# Patient Record
Sex: FEMALE | Race: OTHER | HISPANIC OR LATINO | Employment: UNEMPLOYED | URBAN - METROPOLITAN AREA
[De-identification: names, ages, dates, MRNs, and addresses within clinical notes are randomized per-mention and may not be internally consistent; named-entity substitution may affect disease eponyms.]

---

## 2021-12-08 ENCOUNTER — OFFICE VISIT (OUTPATIENT)
Dept: GASTROENTEROLOGY | Facility: CLINIC | Age: 26
End: 2021-12-08
Payer: COMMERCIAL

## 2021-12-08 VITALS
DIASTOLIC BLOOD PRESSURE: 64 MMHG | WEIGHT: 147 LBS | SYSTOLIC BLOOD PRESSURE: 110 MMHG | BODY MASS INDEX: 29.64 KG/M2 | HEART RATE: 70 BPM | TEMPERATURE: 98.6 F | HEIGHT: 59 IN | RESPIRATION RATE: 16 BRPM

## 2021-12-08 DIAGNOSIS — K21.9 LARYNGOPHARYNGEAL REFLUX (LPR): Primary | ICD-10-CM

## 2021-12-08 DIAGNOSIS — K21.9 GASTROESOPHAGEAL REFLUX DISEASE, UNSPECIFIED WHETHER ESOPHAGITIS PRESENT: ICD-10-CM

## 2021-12-08 PROCEDURE — 99243 OFF/OP CNSLTJ NEW/EST LOW 30: CPT | Performed by: PHYSICIAN ASSISTANT

## 2021-12-08 RX ORDER — PRENATAL VIT/IRON FUM/FOLIC AC 27MG-0.8MG
1 TABLET ORAL DAILY
COMMUNITY
Start: 2021-07-28 | End: 2022-03-11

## 2021-12-08 RX ORDER — OMEPRAZOLE 20 MG/1
20 CAPSULE, DELAYED RELEASE ORAL DAILY
Qty: 30 CAPSULE | Refills: 3 | Status: SHIPPED | OUTPATIENT
Start: 2021-12-08 | End: 2022-08-04

## 2021-12-09 ENCOUNTER — APPOINTMENT (OUTPATIENT)
Dept: RADIOLOGY | Facility: MEDICAL CENTER | Age: 26
End: 2021-12-09
Payer: COMMERCIAL

## 2021-12-09 ENCOUNTER — OFFICE VISIT (OUTPATIENT)
Dept: OBGYN CLINIC | Facility: MEDICAL CENTER | Age: 26
End: 2021-12-09
Payer: COMMERCIAL

## 2021-12-09 VITALS
HEIGHT: 59 IN | DIASTOLIC BLOOD PRESSURE: 70 MMHG | BODY MASS INDEX: 29.64 KG/M2 | HEART RATE: 61 BPM | SYSTOLIC BLOOD PRESSURE: 106 MMHG | WEIGHT: 147 LBS

## 2021-12-09 DIAGNOSIS — M54.2 NECK PAIN: ICD-10-CM

## 2021-12-09 DIAGNOSIS — M79.18 MYOFASCIAL PAIN SYNDROME: Primary | ICD-10-CM

## 2021-12-09 PROCEDURE — 99204 OFFICE O/P NEW MOD 45 MIN: CPT | Performed by: PHYSICAL MEDICINE & REHABILITATION

## 2021-12-09 PROCEDURE — 72040 X-RAY EXAM NECK SPINE 2-3 VW: CPT

## 2021-12-09 RX ORDER — METHYLPREDNISOLONE 4 MG/1
TABLET ORAL
Qty: 1 EACH | Refills: 0 | Status: SHIPPED | OUTPATIENT
Start: 2021-12-09 | End: 2022-03-11

## 2021-12-09 RX ORDER — NAPROXEN 500 MG/1
500 TABLET ORAL 2 TIMES DAILY PRN
Qty: 90 TABLET | Refills: 0 | Status: SHIPPED | OUTPATIENT
Start: 2021-12-09

## 2021-12-15 ENCOUNTER — EVALUATION (OUTPATIENT)
Dept: PHYSICAL THERAPY | Facility: REHABILITATION | Age: 26
End: 2021-12-15
Payer: COMMERCIAL

## 2021-12-15 DIAGNOSIS — M79.18 MYOFASCIAL PAIN SYNDROME: ICD-10-CM

## 2021-12-15 DIAGNOSIS — M54.2 NECK PAIN: ICD-10-CM

## 2021-12-15 PROCEDURE — 97162 PT EVAL MOD COMPLEX 30 MIN: CPT | Performed by: PHYSICAL THERAPIST

## 2021-12-21 ENCOUNTER — OFFICE VISIT (OUTPATIENT)
Dept: PHYSICAL THERAPY | Facility: REHABILITATION | Age: 26
End: 2021-12-21
Payer: COMMERCIAL

## 2021-12-21 DIAGNOSIS — M79.18 MYOFASCIAL PAIN SYNDROME: ICD-10-CM

## 2021-12-21 DIAGNOSIS — M54.2 NECK PAIN: Primary | ICD-10-CM

## 2021-12-21 PROCEDURE — 97110 THERAPEUTIC EXERCISES: CPT

## 2021-12-21 PROCEDURE — 97140 MANUAL THERAPY 1/> REGIONS: CPT

## 2021-12-22 ENCOUNTER — TELEPHONE (OUTPATIENT)
Dept: GASTROENTEROLOGY | Facility: CLINIC | Age: 26
End: 2021-12-22

## 2021-12-23 ENCOUNTER — APPOINTMENT (OUTPATIENT)
Dept: PHYSICAL THERAPY | Facility: REHABILITATION | Age: 26
End: 2021-12-23
Payer: COMMERCIAL

## 2021-12-27 ENCOUNTER — APPOINTMENT (OUTPATIENT)
Dept: PHYSICAL THERAPY | Facility: REHABILITATION | Age: 26
End: 2021-12-27
Payer: COMMERCIAL

## 2021-12-29 ENCOUNTER — APPOINTMENT (OUTPATIENT)
Dept: PHYSICAL THERAPY | Facility: REHABILITATION | Age: 26
End: 2021-12-29
Payer: COMMERCIAL

## 2022-03-10 RX ORDER — BUSPIRONE HYDROCHLORIDE 5 MG/1
5 TABLET ORAL DAILY PRN
COMMUNITY
Start: 2022-01-24 | End: 2023-01-24

## 2022-03-11 ENCOUNTER — OFFICE VISIT (OUTPATIENT)
Dept: FAMILY MEDICINE CLINIC | Facility: CLINIC | Age: 27
End: 2022-03-11
Payer: COMMERCIAL

## 2022-03-11 VITALS
RESPIRATION RATE: 20 BRPM | BODY MASS INDEX: 30.24 KG/M2 | SYSTOLIC BLOOD PRESSURE: 116 MMHG | HEART RATE: 72 BPM | HEIGHT: 59 IN | OXYGEN SATURATION: 98 % | DIASTOLIC BLOOD PRESSURE: 62 MMHG | WEIGHT: 150 LBS | TEMPERATURE: 98 F

## 2022-03-11 DIAGNOSIS — Z00.00 ANNUAL PHYSICAL EXAM: ICD-10-CM

## 2022-03-11 DIAGNOSIS — Z12.4 CERVICAL CANCER SCREENING: Primary | ICD-10-CM

## 2022-03-11 DIAGNOSIS — M54.2 NECK PAIN: ICD-10-CM

## 2022-03-11 DIAGNOSIS — F41.8 SITUATIONAL ANXIETY: ICD-10-CM

## 2022-03-11 PROBLEM — J68.3 REACTIVE AIRWAYS DYSFUNCTION SYNDROME (HCC): Status: ACTIVE | Noted: 2022-03-11

## 2022-03-11 PROCEDURE — 99385 PREV VISIT NEW AGE 18-39: CPT | Performed by: NURSE PRACTITIONER

## 2022-03-11 PROCEDURE — 3725F SCREEN DEPRESSION PERFORMED: CPT | Performed by: NURSE PRACTITIONER

## 2022-03-11 PROCEDURE — 1036F TOBACCO NON-USER: CPT | Performed by: NURSE PRACTITIONER

## 2022-03-11 PROCEDURE — 3008F BODY MASS INDEX DOCD: CPT | Performed by: NURSE PRACTITIONER

## 2022-03-11 NOTE — PROGRESS NOTES
ADULT ANNUAL [facility]    NAME: Alexandra Huerta  AGE: 31 y o  SEX: female  : 1995     DATE: 3/11/2022     Assessment and Plan:     Problem List Items Addressed This Visit        Other    Neck pain    Situational anxiety      Other Visit Diagnoses     Cervical cancer screening    -  Primary    Relevant Orders    Ambulatory Referral to Obstetrics / Gynecology    Annual physical exam              Immunizations and preventive care screenings were discussed with patient today  Appropriate education was printed on patient's after visit summary  Counseling:  Alcohol/drug use: discussed moderation in alcohol intake, the recommendations for healthy alcohol use, and avoidance of illicit drug use  Sexual health: discussed sexually transmitted diseases, partner selection, use of condoms, avoidance of unintended pregnancy, and contraceptive alternatives  · Exercise: the importance of regular exercise/physical activity was discussed  Recommend exercise 3-5 times per week for at least 30 minutes  BMI Counseling: Body mass index is 30 3 kg/m²  The BMI is above normal  Nutrition recommendations include encouraging healthy choices of fruits and vegetables, consuming healthier snacks, limiting drinks that contain sugar, moderation in carbohydrate intake, reducing intake of saturated and trans fat and reducing intake of cholesterol  Exercise recommendations include exercising 3-5 times per week  Rationale for BMI follow-up plan is due to patient being overweight or obese  Depression Screening and Follow-up Plan: Patient was screened for depression during today's encounter  They screened negative with a PHQ-2 score of 0  Return in 1 year (on 3/11/2023)       Chief Complaint:     Chief Complaint   Patient presents with   [address] [place] [address] on the back of throat, was told that it was acid refllux,       History of Present Illness: Adult Annual Physical   Patient here for a comprehensive physical exam  The patient reports problems - drivers permit physical  did have 2 miscarriages in the last 2 years    Not currently working  Diet and Physical Activity  · Diet/Nutrition: well balanced diet, limited junk food and consuming 3-5 servings of fruits/vegetables daily  · Exercise: strength training exercises, 3-4 times a week on average and 30-60 minutes on average  Depression Screening  PHQ-2/9 Depression Screening    Little interest or pleasure in doing things: 0 - not at all  Feeling down, depressed, or hopeless: 0 - not at all  PHQ-2 Score: 0  PHQ-2 Interpretation: Negative depression screen       General Health  · Sleep: sleeps well and gets 7-8 hours of sleep on average  · Hearing: normal - bilateral   · Vision: goes for regular eye exams, most recent eye exam <1 year ago and wears glasses and contacts  · Dental: regular dental visits and brushes teeth twice daily  /GYN Health  · Last menstrual period: 2/11/22  · Contraceptive method: none  · History of STDs?: no       Review of Systems:     Review of Systems   Constitutional: Negative for activity change, appetite change, chills, fatigue and fever  HENT: Negative for congestion, ear pain, nosebleeds, rhinorrhea and sore throat  Eyes: Negative for photophobia, pain, redness and visual disturbance  Respiratory: Negative for cough, shortness of breath and wheezing  Cardiovascular: Negative  Negative for chest pain  Gastrointestinal: Negative  Negative for abdominal pain, constipation, diarrhea and vomiting  Endocrine: Negative  Genitourinary: Negative for difficulty urinating, dysuria and flank pain  Musculoskeletal: Negative  Skin: Negative for color change and rash  Neurological: Negative for dizziness, weakness, numbness and headaches  Hematological: Negative for adenopathy  Psychiatric/Behavioral: Negative for agitation and confusion  The patient is not nervous/anxious  Past Medical History:     History reviewed  No pertinent past medical history  Past Surgical History:     Past Surgical History:   Procedure Laterality Date    TONGUE BIOPSY      WISDOM TOOTH EXTRACTION        Social History:     Social History     Socioeconomic History    Marital status: /Civil Union     Spouse name: None    Number of children: None    Years of education: None    Highest education level: None   Occupational History    None   Tobacco Use    Smoking status: Never Smoker    Smokeless tobacco: Never Used   Vaping Use    Vaping Use: Never used   Substance and Sexual Activity    Alcohol use: Never    Drug use: Never    Sexual activity: Yes     Birth control/protection: None   Other Topics Concern    None   Social History Narrative    - 6 years    Miscarried 2 times        Social Determinants of Health     Financial Resource Strain: Not on file   Food Insecurity: Not on file   Transportation Needs: Not on file   Physical Activity: Not on file   Stress: Not on file   Social Connections: Not on file   Intimate Partner Violence: Not on file   Housing Stability: Not on file      Family History:     Family History   Problem Relation Age of Onset    Diabetes Mother     Hyperlipidemia Mother     Hypertension Mother     Anemia Sister       Current Medications:     Current Outpatient Medications   Medication Sig Dispense Refill    busPIRone (BUSPAR) 5 mg tablet Take 5 mg by mouth daily as needed        Cholecalciferol 50 MCG (2000 UT) CAPS Take 2 capsules by mouth daily      fluticasone (FLONASE) 50 mcg/act nasal spray 2 sprays into each nostril daily 18 2 mL 3    naproxen (NAPROSYN) 500 mg tablet Take 1 tablet (500 mg total) by mouth 2 (two) times a day as needed for moderate pain 90 tablet 0    omeprazole (PriLOSEC) 20 mg delayed release capsule Take 1 capsule (20 mg total) by mouth daily 30 minutes before breakfast 30 capsule 3 No current facility-administered medications for this visit  Allergies:     No Known Allergies   Physical Exam:     /62   Pulse 72   Temp 98 °F (36 7 °C) (Tympanic)   Resp 20   Ht 4' 11" (1 499 m)   Wt 68 kg (150 lb)   SpO2 98%   BMI 30 30 kg/m²     Physical Exam  Vitals and nursing note reviewed  Constitutional:       General: She is not in acute distress  Appearance: She is well-developed  She is not ill-appearing or diaphoretic  HENT:      Head: Normocephalic and atraumatic  Right Ear: Hearing, tympanic membrane and ear canal normal       Left Ear: Hearing, tympanic membrane and ear canal normal       Nose: Nose normal       Mouth/Throat:      Pharynx: Oropharynx is clear  Uvula midline  No posterior oropharyngeal erythema  Eyes:      General: Lids are normal       Conjunctiva/sclera: Conjunctivae normal    Cardiovascular:      Rate and Rhythm: Normal rate and regular rhythm  Heart sounds: Normal heart sounds, S1 normal and S2 normal  No murmur heard  No gallop  Pulmonary:      Effort: Pulmonary effort is normal  No respiratory distress  Breath sounds: Normal breath sounds  Musculoskeletal:         General: No tenderness  Normal range of motion  Lymphadenopathy:      Cervical: No cervical adenopathy  Skin:     General: Skin is warm  Capillary Refill: Capillary refill takes less than 2 seconds  Findings: No rash  Neurological:      General: No focal deficit present  Mental Status: She is alert  Psychiatric:         Behavior: Behavior normal  Behavior is cooperative  Thought Content:  Thought content normal          Judgment: Judgment normal           SHINE Hoang   [facility]

## 2022-03-11 NOTE — PATIENT INSTRUCTIONS

## 2022-07-06 ENCOUNTER — TELEPHONE (OUTPATIENT)
Dept: GASTROENTEROLOGY | Facility: CLINIC | Age: 27
End: 2022-07-06

## 2022-07-06 NOTE — TELEPHONE ENCOUNTER
Called patient regarding missed 7/6 appointment with JUSTICE Unger  Left message to call so that we can assist patient rescheduling her missed appointment  Also sent letter

## 2022-07-12 ENCOUNTER — TELEPHONE (OUTPATIENT)
Dept: GYNECOLOGY | Facility: CLINIC | Age: 27
End: 2022-07-12

## 2022-07-12 NOTE — TELEPHONE ENCOUNTER
Called and lm regarding no show for todays appt  Told her to call the office if she was interested in rescheduling

## 2022-07-14 ENCOUNTER — OFFICE VISIT (OUTPATIENT)
Dept: GASTROENTEROLOGY | Facility: CLINIC | Age: 27
End: 2022-07-14
Payer: COMMERCIAL

## 2022-07-14 VITALS
OXYGEN SATURATION: 98 % | WEIGHT: 153 LBS | HEIGHT: 59 IN | TEMPERATURE: 98.6 F | BODY MASS INDEX: 30.84 KG/M2 | DIASTOLIC BLOOD PRESSURE: 66 MMHG | HEART RATE: 54 BPM | SYSTOLIC BLOOD PRESSURE: 118 MMHG

## 2022-07-14 DIAGNOSIS — K21.9 LARYNGOPHARYNGEAL REFLUX (LPR): Primary | ICD-10-CM

## 2022-07-14 DIAGNOSIS — K21.9 GASTROESOPHAGEAL REFLUX DISEASE, UNSPECIFIED WHETHER ESOPHAGITIS PRESENT: ICD-10-CM

## 2022-07-14 PROCEDURE — 99213 OFFICE O/P EST LOW 20 MIN: CPT | Performed by: PHYSICIAN ASSISTANT

## 2022-07-14 RX ORDER — SODIUM CHLORIDE 9 MG/ML
125 INJECTION, SOLUTION INTRAVENOUS CONTINUOUS
Status: CANCELLED | OUTPATIENT
Start: 2022-07-14

## 2022-07-14 NOTE — PATIENT INSTRUCTIONS
Scheduled date of EGD(as of today): 07/18/2022  Physician performing EGD: Dr Alvin Gold  Location of EGD: [facility]  Instructions reviewed with patient by: FLORIDALMA  Clearances:  n/a

## 2022-07-14 NOTE — H&P (VIEW-ONLY)
[facility] - Outpatient Follow-up Note  Laura Whitt 28 y o  female MRN: [id number]  Encounter: [id number]      ASSESSMENT AND PLAN:      1  Laryngopharyngeal reflux (LPR)  2  Gastroesophageal reflux disease, unspecified whether esophagitis present  She reports intermittent burning/sore throat, phlegm, globus sensation, and regurgitation for over 1 year  She was seen by ENT and diagnosed with reflux by laryngoscopy  Symptoms have improved with short courses of omeprazole but she still reports some burning in her throat       She still wants to reschedule the EGD  We can evaluate for erosive reflux disease, hiatal hernia, and eosinophilic esophagitis  She will continue to limit coffee, tomatoes, chocolate which are reflux trigger foods  She will continue to eat at least 2 hours before going to bed and follow other lifestyle recommendations      - EGD; Future    Follow-up in 3 months  ______________________________________________________________________     SUBJECTIVE:   54-year-old female with allergies presenting for follow-up of reflux  Since her last visit, she took omeprazole for a few days  This significantly improved her reflux  She was off omeprazole for a few months then restarted it about 1 month ago due to burning in her throat  This did help but she still reports nighttime burning  Overall her sore throat, phlegm, globus sensation, and regurgitation has improved  She did not undergo endoscopy yet, but she wants to reschedule      REVIEW OF SYSTEMS IS OTHERWISE NEGATIVE  Historical Information   History reviewed  No pertinent past medical history    Past Surgical History:   Procedure Laterality Date    TONGUE BIOPSY      WISDOM TOOTH EXTRACTION       Social History   Social History     Substance and Sexual Activity   Alcohol Use Never     Social History     Substance and Sexual Activity   Drug Use Never     Social History     Tobacco Use   Smoking Status Never Smoker Smokeless Tobacco Never Used     Family History   Problem Relation Age of Onset    Diabetes Mother     Hyperlipidemia Mother     Hypertension Mother     Anemia Sister        Meds/Allergies       Current Outpatient Medications:     busPIRone (BUSPAR) 5 mg tablet    Cholecalciferol 50 MCG (2000 UT) CAPS    fluticasone (FLONASE) 50 mcg/act nasal spray    naproxen (NAPROSYN) 500 mg tablet    omeprazole (PriLOSEC) 20 mg delayed release capsule    No Known Allergies        Objective     Blood pressure 118/66, pulse (!) 54, temperature 98 6 °F (37 °C), temperature source Tympanic, height 4' 11" (1 499 m), weight 69 4 kg (153 lb), SpO2 98 %  Body mass index is 30 9 kg/m²  PHYSICAL EXAM:      General Appearance:   Alert, cooperative, no distress   HEENT:   Normocephalic, atraumatic, anicteric      Neck:  Supple, symmetrical, trachea midline   Lungs:   Clear to auscultation bilaterally; no rales, rhonchi or wheezing; respirations unlabored    Heart[de-identified]   Regular rate and rhythm; no murmur, rub, or gallop  Abdomen:   Soft, non-tender, non-distended; normal bowel sounds; no masses, no organomegaly    Genitalia:   Deferred    Rectal:   Deferred    Extremities:  No cyanosis, clubbing or edema    Pulses:  2+ and symmetric    Skin:  No jaundice, rashes, or lesions    Lymph nodes:  No palpable cervical lymphadenopathy        Lab Results:   No visits with results within 1 Day(s) from this visit  Latest known visit with results is:   No results found for any previous visit  Radiology Results:   No results found

## 2022-07-14 NOTE — PROGRESS NOTES
[facility] - Outpatient Follow-up Note  Roro Mcdermott 28 y o  female MRN: [id number]  Encounter: [id number]      ASSESSMENT AND PLAN:      1  Laryngopharyngeal reflux (LPR)  2  Gastroesophageal reflux disease, unspecified whether esophagitis present  She reports intermittent burning/sore throat, phlegm, globus sensation, and regurgitation for over 1 year  She was seen by ENT and diagnosed with reflux by laryngoscopy  Symptoms have improved with short courses of omeprazole but she still reports some burning in her throat       She still wants to reschedule the EGD  We can evaluate for erosive reflux disease, hiatal hernia, and eosinophilic esophagitis  She will continue to limit coffee, tomatoes, chocolate which are reflux trigger foods  She will continue to eat at least 2 hours before going to bed and follow other lifestyle recommendations      - EGD; Future    Follow-up in 3 months  ______________________________________________________________________     SUBJECTIVE:   76-year-old female with allergies presenting for follow-up of reflux  Since her last visit, she took omeprazole for a few days  This significantly improved her reflux  She was off omeprazole for a few months then restarted it about 1 month ago due to burning in her throat  This did help but she still reports nighttime burning  Overall her sore throat, phlegm, globus sensation, and regurgitation has improved  She did not undergo endoscopy yet, but she wants to reschedule      REVIEW OF SYSTEMS IS OTHERWISE NEGATIVE  Historical Information   History reviewed  No pertinent past medical history    Past Surgical History:   Procedure Laterality Date    TONGUE BIOPSY      WISDOM TOOTH EXTRACTION       Social History   Social History     Substance and Sexual Activity   Alcohol Use Never     Social History     Substance and Sexual Activity   Drug Use Never     Social History     Tobacco Use   Smoking Status Never Smoker Smokeless Tobacco Never Used     Family History   Problem Relation Age of Onset    Diabetes Mother     Hyperlipidemia Mother     Hypertension Mother     Anemia Sister        Meds/Allergies       Current Outpatient Medications:     busPIRone (BUSPAR) 5 mg tablet    Cholecalciferol 50 MCG (2000 UT) CAPS    fluticasone (FLONASE) 50 mcg/act nasal spray    naproxen (NAPROSYN) 500 mg tablet    omeprazole (PriLOSEC) 20 mg delayed release capsule    No Known Allergies        Objective     Blood pressure 118/66, pulse (!) 54, temperature 98 6 °F (37 °C), temperature source Tympanic, height 4' 11" (1 499 m), weight 69 4 kg (153 lb), SpO2 98 %  Body mass index is 30 9 kg/m²  PHYSICAL EXAM:      General Appearance:   Alert, cooperative, no distress   HEENT:   Normocephalic, atraumatic, anicteric      Neck:  Supple, symmetrical, trachea midline   Lungs:   Clear to auscultation bilaterally; no rales, rhonchi or wheezing; respirations unlabored    Heart[de-identified]   Regular rate and rhythm; no murmur, rub, or gallop  Abdomen:   Soft, non-tender, non-distended; normal bowel sounds; no masses, no organomegaly    Genitalia:   Deferred    Rectal:   Deferred    Extremities:  No cyanosis, clubbing or edema    Pulses:  2+ and symmetric    Skin:  No jaundice, rashes, or lesions    Lymph nodes:  No palpable cervical lymphadenopathy        Lab Results:   No visits with results within 1 Day(s) from this visit  Latest known visit with results is:   No results found for any previous visit  Radiology Results:   No results found

## 2022-07-14 NOTE — LETTER
July 14, 2022     Estee Gomes, [address]  [address] [place] [address]    Patient: Naz Higgins   YOB: 1995   Date of Visit: 7/14/2022       Dear Dr Joanna Reyez:    Thank you for referring Naz Higgins to me for evaluation  Below are my notes for this consultation  If you have questions, please do not hesitate to call me  I look forward to following your patient along with you  Sincerely,        Blue Nam PA-C        CC: No Recipients  Blue Nam PA-C  7/14/2022 12:56 PM  Sign when Signing Visit  [facility] - Outpatient Follow-up [facility]  Naz Higgins 28 y o  female MRN: [id number]  Encounter: [id number]      ASSESSMENT AND PLAN:      1  Laryngopharyngeal reflux (LPR)  2  Gastroesophageal reflux disease, unspecified whether esophagitis present  She reports intermittent burning/sore throat, phlegm, globus sensation, and regurgitation for over 1 year  She was seen by ENT and diagnosed with reflux by laryngoscopy  Symptoms have improved with short courses of omeprazole but she still reports some burning in her throat       She still wants to reschedule the EGD  We can evaluate for erosive reflux disease, hiatal hernia, and eosinophilic esophagitis  She will continue to limit coffee, tomatoes, chocolate which are reflux trigger foods  She will continue to eat at least 2 hours before going to bed and follow other lifestyle recommendations      - EGD; Future    Follow-up in 3 months  ______________________________________________________________________     SUBJECTIVE:   50-year-old female with allergies presenting for follow-up of reflux  Since her last visit, she took omeprazole for a few days  This significantly improved her reflux  She was off omeprazole for a few months then restarted it about 1 month ago due to burning in her throat  This did help but she still reports nighttime burning   Overall her sore throat, phlegm, globus sensation, and regurgitation has improved  She did not undergo endoscopy yet, but she wants to reschedule      REVIEW OF SYSTEMS IS OTHERWISE NEGATIVE  Historical Information   History reviewed  No pertinent past medical history  Past Surgical History:   Procedure Laterality Date    TONGUE BIOPSY      WISDOM TOOTH EXTRACTION       Social History   Social History     Substance and Sexual Activity   Alcohol Use Never     Social History     Substance and Sexual Activity   Drug Use Never     Social History     Tobacco Use   Smoking Status Never Smoker   Smokeless Tobacco Never Used     Family History   Problem Relation Age of Onset    Diabetes Mother     Hyperlipidemia Mother     Hypertension Mother     Anemia Sister        Meds/Allergies       Current Outpatient Medications:     busPIRone (BUSPAR) 5 mg tablet    Cholecalciferol 50 MCG (2000 UT) CAPS    fluticasone (FLONASE) 50 mcg/act nasal spray    naproxen (NAPROSYN) 500 mg tablet    omeprazole (PriLOSEC) 20 mg delayed release capsule    No Known Allergies        Objective     Blood pressure 118/66, pulse (!) 54, temperature 98 6 °F (37 °C), temperature source Tympanic, height 4' 11" (1 499 m), weight 69 4 kg (153 lb), SpO2 98 %  Body mass index is 30 9 kg/m²  PHYSICAL EXAM:      General Appearance:   Alert, cooperative, no distress   HEENT:   Normocephalic, atraumatic, anicteric      Neck:  Supple, symmetrical, trachea midline   Lungs:   Clear to auscultation bilaterally; no rales, rhonchi or wheezing; respirations unlabored    Heart[de-identified]   Regular rate and rhythm; no murmur, rub, or gallop     Abdomen:   Soft, non-tender, non-distended; normal bowel sounds; no masses, no organomegaly    Genitalia:   Deferred    Rectal:   Deferred    Extremities:  No cyanosis, clubbing or edema    Pulses:  2+ and symmetric    Skin:  No jaundice, rashes, or lesions    Lymph nodes:  No palpable cervical lymphadenopathy        Lab Results:   No visits with results within 1 Day(s) from this visit  Latest known visit with results is:   No results found for any previous visit  Radiology Results:   No results found

## 2022-07-17 ENCOUNTER — ANESTHESIA (OUTPATIENT)
Dept: ANESTHESIOLOGY | Facility: HOSPITAL | Age: 27
End: 2022-07-17

## 2022-07-17 ENCOUNTER — ANESTHESIA EVENT (OUTPATIENT)
Dept: ANESTHESIOLOGY | Facility: HOSPITAL | Age: 27
End: 2022-07-17

## 2022-07-17 NOTE — ANESTHESIA PREPROCEDURE EVALUATION
Procedure:  PRE-OP ONLY    Relevant Problems   ANESTHESIA (within normal limits)      CARDIO (within normal limits)      ENDO (within normal limits)      GI/HEPATIC (within normal limits)      /RENAL (within normal limits)      GYN (within normal limits)      HEMATOLOGY (within normal limits)      MUSCULOSKELETAL (within normal limits)      NEURO/PSYCH   (+) Situational anxiety      PULMONARY (within normal limits)             Anesthesia Plan  ASA Score- 1     Anesthesia Type- IV sedation with anesthesia with ASA Monitors  Additional Monitors:   Airway Plan:           Plan Factors-Exercise tolerance (METS): >4 METS  Chart reviewed  Patient summary reviewed  Patient is not a current smoker  Induction- intravenous  Postoperative Plan-     Informed Consent- Anesthetic plan and risks discussed with patient

## 2022-07-18 ENCOUNTER — ANESTHESIA (OUTPATIENT)
Dept: GASTROENTEROLOGY | Facility: MEDICAL CENTER | Age: 27
End: 2022-07-18

## 2022-07-18 ENCOUNTER — ANESTHESIA EVENT (OUTPATIENT)
Dept: GASTROENTEROLOGY | Facility: MEDICAL CENTER | Age: 27
End: 2022-07-18

## 2022-07-18 ENCOUNTER — HOSPITAL ENCOUNTER (OUTPATIENT)
Dept: GASTROENTEROLOGY | Facility: MEDICAL CENTER | Age: 27
Setting detail: OUTPATIENT SURGERY
Discharge: HOME | End: 2022-07-18
Payer: COMMERCIAL

## 2022-07-18 VITALS
RESPIRATION RATE: 18 BRPM | HEIGHT: 59 IN | BODY MASS INDEX: 30.84 KG/M2 | SYSTOLIC BLOOD PRESSURE: 103 MMHG | WEIGHT: 153 LBS | HEART RATE: 81 BPM | OXYGEN SATURATION: 97 % | TEMPERATURE: 97.6 F | DIASTOLIC BLOOD PRESSURE: 59 MMHG

## 2022-07-18 DIAGNOSIS — K21.9 GASTROESOPHAGEAL REFLUX DISEASE, UNSPECIFIED WHETHER ESOPHAGITIS PRESENT: ICD-10-CM

## 2022-07-18 DIAGNOSIS — K21.9 LARYNGOPHARYNGEAL REFLUX (LPR): ICD-10-CM

## 2022-07-18 LAB
EXT PREGNANCY TEST URINE: NEGATIVE
EXT. CONTROL: NORMAL

## 2022-07-18 PROCEDURE — 43239 EGD BIOPSY SINGLE/MULTIPLE: CPT | Performed by: INTERNAL MEDICINE

## 2022-07-18 PROCEDURE — 88305 TISSUE EXAM BY PATHOLOGIST: CPT | Performed by: PATHOLOGY

## 2022-07-18 PROCEDURE — 81025 URINE PREGNANCY TEST: CPT | Performed by: ANESTHESIOLOGY

## 2022-07-18 PROCEDURE — 88342 IMHCHEM/IMCYTCHM 1ST ANTB: CPT | Performed by: PATHOLOGY

## 2022-07-18 RX ORDER — MIDAZOLAM HYDROCHLORIDE 2 MG/2ML
INJECTION, SOLUTION INTRAMUSCULAR; INTRAVENOUS AS NEEDED
Status: DISCONTINUED | OUTPATIENT
Start: 2022-07-18 | End: 2022-07-18

## 2022-07-18 RX ORDER — LIDOCAINE HYDROCHLORIDE 20 MG/ML
INJECTION, SOLUTION EPIDURAL; INFILTRATION; INTRACAUDAL; PERINEURAL AS NEEDED
Status: DISCONTINUED | OUTPATIENT
Start: 2022-07-18 | End: 2022-07-18

## 2022-07-18 RX ORDER — SODIUM CHLORIDE 9 MG/ML
125 INJECTION, SOLUTION INTRAVENOUS CONTINUOUS
Status: DISCONTINUED | OUTPATIENT
Start: 2022-07-18 | End: 2022-07-22 | Stop reason: HOSPADM

## 2022-07-18 RX ORDER — PROPOFOL 10 MG/ML
INJECTION, EMULSION INTRAVENOUS AS NEEDED
Status: DISCONTINUED | OUTPATIENT
Start: 2022-07-18 | End: 2022-07-18

## 2022-07-18 RX ADMIN — PROPOFOL 100 MG: 10 INJECTION, EMULSION INTRAVENOUS at 13:14

## 2022-07-18 RX ADMIN — Medication 20 MG: at 13:14

## 2022-07-18 RX ADMIN — MIDAZOLAM 2 MG: 1 INJECTION INTRAMUSCULAR; INTRAVENOUS at 13:11

## 2022-07-18 RX ADMIN — SODIUM CHLORIDE 125 ML/HR: 0.9 INJECTION, SOLUTION INTRAVENOUS at 12:55

## 2022-07-18 RX ADMIN — LIDOCAINE HYDROCHLORIDE 80 MG: 20 INJECTION, SOLUTION EPIDURAL; INFILTRATION; INTRACAUDAL; PERINEURAL at 13:14

## 2022-07-18 NOTE — INTERVAL H&P NOTE
H&P reviewed  After examining the patient I find no changes in the patients condition since the H&P had been written      Vitals:    07/18/22 1250   BP: 111/62   Pulse: 69   Resp: 20   Temp: 97 6 °F (36 4 °C)   SpO2: 99%

## 2022-07-18 NOTE — ANESTHESIA POSTPROCEDURE EVALUATION
Post-Op Assessment Note    CV Status:  Stable    Pain management: adequate     Mental Status:  Alert and awake   Hydration Status:  Euvolemic   PONV Controlled:  Controlled   Airway Patency:  Patent      Post Op Vitals Reviewed: Yes      Staff: Anesthesiologist         No complications documented      BP      Temp      Pulse     Resp      SpO2      /59   Pulse 81   Temp 97 6 °F (36 4 °C) (Temporal)   Resp 18   Ht 4' 11" (1 499 m)   Wt 69 4 kg (153 lb)   SpO2 97%   BMI 30 90 kg/m²

## 2022-07-18 NOTE — ANESTHESIA PREPROCEDURE EVALUATION
Procedure:  EGD    Relevant Problems   ANESTHESIA (within normal limits)      CARDIO (within normal limits)      ENDO (within normal limits)      GI/HEPATIC (within normal limits)      /RENAL (within normal limits)      GYN (within normal limits)      HEMATOLOGY (within normal limits)      MUSCULOSKELETAL (within normal limits)      NEURO/PSYCH   (+) Situational anxiety      PULMONARY (within normal limits)        Physical Exam    Airway    Mallampati score: III  TM Distance: >3 FB  Neck ROM: full     Dental   No notable dental hx     Cardiovascular  Rhythm: regular, Rate: normal, Cardiovascular exam normal    Pulmonary  Pulmonary exam normal Breath sounds clear to auscultation,     Other Findings        Anesthesia Plan  ASA Score- 1     Anesthesia Type- IV sedation with anesthesia with ASA Monitors  Additional Monitors:   Airway Plan:           Plan Factors-Exercise tolerance (METS): >4 METS  Chart reviewed  Patient summary reviewed  Patient is not a current smoker  Induction- intravenous  Postoperative Plan-     Informed Consent- Anesthetic plan and risks discussed with patient

## 2022-07-25 ENCOUNTER — OFFICE VISIT (OUTPATIENT)
Dept: FAMILY MEDICINE CLINIC | Facility: CLINIC | Age: 27
End: 2022-07-25
Payer: COMMERCIAL

## 2022-07-25 VITALS
HEIGHT: 59 IN | DIASTOLIC BLOOD PRESSURE: 68 MMHG | TEMPERATURE: 98.4 F | BODY MASS INDEX: 30.84 KG/M2 | SYSTOLIC BLOOD PRESSURE: 102 MMHG | WEIGHT: 153 LBS | OXYGEN SATURATION: 99 % | HEART RATE: 88 BPM

## 2022-07-25 DIAGNOSIS — R53.83 OTHER FATIGUE: ICD-10-CM

## 2022-07-25 DIAGNOSIS — R52 BODY ACHES: Primary | ICD-10-CM

## 2022-07-25 DIAGNOSIS — R51.9 NONINTRACTABLE HEADACHE, UNSPECIFIED CHRONICITY PATTERN, UNSPECIFIED HEADACHE TYPE: ICD-10-CM

## 2022-07-25 PROCEDURE — U0003 INFECTIOUS AGENT DETECTION BY NUCLEIC ACID (DNA OR RNA); SEVERE ACUTE RESPIRATORY SYNDROME CORONAVIRUS 2 (SARS-COV-2) (CORONAVIRUS DISEASE [COVID-19]), AMPLIFIED PROBE TECHNIQUE, MAKING USE OF HIGH THROUGHPUT TECHNOLOGIES AS DESCRIBED BY CMS-2020-01-R: HCPCS | Performed by: NURSE PRACTITIONER

## 2022-07-25 PROCEDURE — 3725F SCREEN DEPRESSION PERFORMED: CPT | Performed by: NURSE PRACTITIONER

## 2022-07-25 PROCEDURE — U0005 INFEC AGEN DETEC AMPLI PROBE: HCPCS | Performed by: NURSE PRACTITIONER

## 2022-07-25 PROCEDURE — 99213 OFFICE O/P EST LOW 20 MIN: CPT | Performed by: NURSE PRACTITIONER

## 2022-07-25 NOTE — PROGRESS NOTES
[facility]        NAME: Imelda Thornton is a 28 y o  female  : 1995    MRN: [id number]  DATE: 2022  TIME: 12:15 PM    Assessment and Plan   Body aches [R52]  1  Body aches     2  Nonintractable headache, unspecified chronicity pattern, unspecified headache type     3  Other fatigue       1  Body aches  Suspect COVID with significant other testing positive  2 home tests negative  Will confirm with PCR test  Suspect she has COVD  Symptomatic treatment as discussed  Alternate Tylenol and ibuprofen  - Ramirezmouth    2  Nonintractable headache, unspecified chronicity pattern, unspecified headache type    - COVID Only- Office Collect    3  Other fatigue    - COVID Only- Office Collect      Patient Instructions     There are no Patient Instructions on file for this visit  Chief Complaint     Chief Complaint   Patient presents with    Headache     Headache started Friday, migraine  Sore throat, aches   tested posted Saturday  She took two tests and both came back negative  Metal taste in mouth for about three days  History of Present Illness       Patient here for an acute sick visit with symptoms starting 3 days ago  Headaches, nausea, bodyaches, decreased appetite, metallic taste in mouth, fatigue, sore throat, post nasal drip but has allergies, back feels sore    Tested positive on home test for COVID and 2 tested negative on 2 diferents tests  Review of Systems   Review of Systems   Constitutional: Positive for chills and fatigue  HENT: Positive for postnasal drip and sore throat  Respiratory: Negative  Negative for cough, chest tightness and shortness of breath  Cardiovascular: Negative  Negative for chest pain  Musculoskeletal: Positive for myalgias  Skin: Negative  Negative for rash  Neurological: Positive for headaches  Negative for dizziness         PHQ-2/9 Depression Screening    Little interest or pleasure in doing things: 0 - not at all  Feeling down, depressed, or hopeless: 0 - not at all  PHQ-2 Score: 0  PHQ-2 Interpretation: Negative depression screen        Current Medications       Current Outpatient Medications:     Cholecalciferol 50 MCG (2000 UT) CAPS, Take 2 capsules by mouth daily, Disp: , Rfl:     naproxen (NAPROSYN) 500 mg tablet, Take 1 tablet (500 mg total) by mouth 2 (two) times a day as needed for moderate pain, Disp: 90 tablet, Rfl: 0    omeprazole (PriLOSEC) 20 mg delayed release capsule, Take 1 capsule (20 mg total) by mouth daily 30 minutes before breakfast, Disp: 30 capsule, Rfl: 3    busPIRone (BUSPAR) 5 mg tablet, Take 5 mg by mouth daily as needed   (Patient not taking: No sig reported), Disp: , Rfl:     fluticasone (FLONASE) 50 mcg/act nasal spray, 2 sprays into each nostril daily (Patient not taking: No sig reported), Disp: 18 2 mL, Rfl: 3    Current Allergies     Allergies as of 07/25/2022    (No Known Allergies)            The following portions of the patient's history were reviewed and updated as appropriate: allergies, current medications, past family history, past medical history, past social history, past surgical history and problem list      Past Medical History:   Diagnosis Date    Anxiety     Reactive airway disease        Past Surgical History:   Procedure Laterality Date    TONGUE BIOPSY      WISDOM TOOTH EXTRACTION         Family History   Problem Relation Age of Onset    Diabetes Mother     Hyperlipidemia Mother     Hypertension Mother     Anemia Sister          Medications have been verified  Objective   /68   Pulse 88   Temp 98 4 °F (36 9 °C)   Ht 4' 11" (1 499 m)   Wt 69 4 kg (153 lb)   SpO2 99%   BMI 30 90 kg/m²        Physical Exam     Physical Exam  Vitals and nursing note reviewed  Constitutional:       General: She is not in acute distress  Appearance: Normal appearance  She is well-developed  She is not ill-appearing     Eyes: General: Lids are normal    Cardiovascular:      Rate and Rhythm: Normal rate and regular rhythm  Heart sounds: Normal heart sounds, S1 normal and S2 normal  No murmur heard  Pulmonary:      Effort: Pulmonary effort is normal  No respiratory distress  Breath sounds: Normal breath sounds  No decreased breath sounds or wheezing  Musculoskeletal:         General: No tenderness or deformity  Normal range of motion  Skin:     General: Skin is warm  Findings: No erythema or rash  Neurological:      Mental Status: She is alert and oriented to person, place, and time  Psychiatric:         Behavior: Behavior normal  Behavior is cooperative  Thought Content:  Thought content normal

## 2022-07-26 LAB — SARS-COV-2 RNA RESP QL NAA+PROBE: POSITIVE

## 2022-08-03 ENCOUNTER — TELEPHONE (OUTPATIENT)
Dept: GASTROENTEROLOGY | Facility: CLINIC | Age: 27
End: 2022-08-03

## 2022-08-03 NOTE — TELEPHONE ENCOUNTER
----- Message from Ian Bryant PA-C sent at 8/3/2022  8:15 AM EDT -----  Miriam Cruz nurse: Please call patient with H pylori results and order treatment per protocol  Thank you!

## 2022-08-04 ENCOUNTER — TELEPHONE (OUTPATIENT)
Dept: GASTROENTEROLOGY | Facility: CLINIC | Age: 27
End: 2022-08-04

## 2022-08-04 DIAGNOSIS — A04.8 H. PYLORI INFECTION: Primary | ICD-10-CM

## 2022-08-04 RX ORDER — PANTOPRAZOLE SODIUM 40 MG/1
40 TABLET, DELAYED RELEASE ORAL 2 TIMES DAILY
Qty: 28 TABLET | Refills: 0 | Status: SHIPPED | OUTPATIENT
Start: 2022-08-04 | End: 2022-09-27 | Stop reason: ALTCHOICE

## 2022-08-04 RX ORDER — TETRACYCLINE HYDROCHLORIDE 500 MG/1
500 CAPSULE ORAL 4 TIMES DAILY
Qty: 56 CAPSULE | Refills: 0 | Status: SHIPPED | OUTPATIENT
Start: 2022-08-04 | End: 2022-08-18

## 2022-08-04 RX ORDER — BISMUTH SUBSALICYLATE 262 MG/1
262 TABLET, CHEWABLE ORAL
Qty: 56 TABLET | Refills: 0 | Status: SHIPPED | OUTPATIENT
Start: 2022-08-04 | End: 2022-08-18

## 2022-08-04 RX ORDER — METRONIDAZOLE 250 MG/1
250 TABLET ORAL 4 TIMES DAILY
Qty: 56 TABLET | Refills: 0 | Status: SHIPPED | OUTPATIENT
Start: 2022-08-04 | End: 2022-08-18

## 2022-08-04 NOTE — TELEPHONE ENCOUNTER
----- Message from Marta Villa PA-C sent at 8/3/2022  8:15 AM EDT -----  Courtney Cramer nurse: Please call patient with H pylori results and order treatment per protocol  Thank you!

## 2022-09-27 ENCOUNTER — OFFICE VISIT (OUTPATIENT)
Dept: OBGYN CLINIC | Facility: CLINIC | Age: 27
End: 2022-09-27
Payer: COMMERCIAL

## 2022-09-27 VITALS
WEIGHT: 155 LBS | DIASTOLIC BLOOD PRESSURE: 68 MMHG | HEIGHT: 59 IN | BODY MASS INDEX: 31.25 KG/M2 | SYSTOLIC BLOOD PRESSURE: 112 MMHG

## 2022-09-27 DIAGNOSIS — Z12.4 CERVICAL CANCER SCREENING: ICD-10-CM

## 2022-09-27 DIAGNOSIS — Z12.4 SCREENING FOR MALIGNANT NEOPLASM OF THE CERVIX: ICD-10-CM

## 2022-09-27 DIAGNOSIS — Z01.419 ENCOUNTER FOR GYNECOLOGICAL EXAMINATION WITHOUT ABNORMAL FINDING: Primary | ICD-10-CM

## 2022-09-27 PROCEDURE — 0503F POSTPARTUM CARE VISIT: CPT | Performed by: OBSTETRICS & GYNECOLOGY

## 2022-09-27 PROCEDURE — G0143 SCR C/V CYTO,THINLAYER,RESCR: HCPCS | Performed by: OBSTETRICS & GYNECOLOGY

## 2022-09-27 PROCEDURE — 99385 PREV VISIT NEW AGE 18-39: CPT | Performed by: OBSTETRICS & GYNECOLOGY

## 2022-09-27 NOTE — PROGRESS NOTES
Priyanka Doan is a 28 y o  female who presents for annual well woman exam  Periods are regular every 28-30 days, lasting 5 days  No intermenstrual bleeding, spotting, or discharge  Current contraception: none  History of abnormal Pap smear: no  Family history of uterine or ovarian cancer: no  Family history of breast cancer: no    Menstrual History:  OB History        2    Para        Term                AB   2    Living           SAB        IAB        Ectopic        Multiple        Live Births                      Patient's last menstrual period was 2022  Period Cycle (Days): 35  Period Duration (Days): 5  Period Pattern: Regular  Menstrual Flow: Heavy  Dysmenorrhea: (!) Mild  Dysmenorrhea Symptoms: Cramping (bloating)    The following portions of the patient's history were reviewed and updated as appropriate: allergies, current medications, past family history, past medical history, past social history, past surgical history and problem list     Review of Systems  Review of Systems   Constitutional: Negative for activity change, appetite change, chills, fatigue and fever  Respiratory: Negative for cough and shortness of breath  Cardiovascular: Negative for chest pain, palpitations and leg swelling  Gastrointestinal: Negative for abdominal pain, constipation, diarrhea, nausea and vomiting  Genitourinary: Negative for difficulty urinating, dysuria, flank pain, frequency, hematuria, urgency and vaginal discharge  Neurological: Negative for dizziness and headaches  Psychiatric/Behavioral: Negative for confusion            Objective      /68 (BP Location: Left arm, Patient Position: Sitting, Cuff Size: Adult)   Ht 4' 11" (1 499 m)   Wt 70 3 kg (155 lb)   LMP 2022   BMI 31 31 kg/m²     Physical Exam  OBGyn Exam     General:   alert and oriented, in no acute distress, alert, appears stated age and cooperative   Heart: regular rate and rhythm, S1, S2 normal, no murmur, click, rub or gallop   Lungs: clear to auscultation bilaterally   Abdomen: soft, non-tender, without masses or organomegaly   Vulva: normal   Vagina: normal mucosa, normal discharge   Cervix: no cervical motion tenderness and no lesions   Uterus: normal size   Adnexa:  Breast Exam:  normal adnexa  breasts appear normal, no suspicious masses, no skin or nipple changes or axillary nodes  Assessment      @well woman@   27 yo female   Annual exam    [id number] ([address] [facility] , second one very early  Miscarriage )  Desires to conceive   Plan   Pap/r  Diet/exercsie  pnv daily  rto with next pregnancy or for annual exam    All questions answered  There are no Patient Instructions on file for this visit

## 2022-10-06 LAB
LAB AP GYN PRIMARY INTERPRETATION: NORMAL
LAB AP LMP: NORMAL
Lab: NORMAL

## 2023-08-22 ENCOUNTER — TELEPHONE (OUTPATIENT)
Dept: OTHER | Facility: OTHER | Age: 28
End: 2023-08-22

## 2023-10-20 ENCOUNTER — TELEPHONE (OUTPATIENT)
Dept: OBGYN CLINIC | Facility: CLINIC | Age: 28
End: 2023-10-20

## 2023-10-20 ENCOUNTER — APPOINTMENT (OUTPATIENT)
Dept: LAB | Age: 28
End: 2023-10-20
Payer: COMMERCIAL

## 2023-10-20 DIAGNOSIS — N91.2 AMENORRHEA: Primary | ICD-10-CM

## 2023-10-20 DIAGNOSIS — N91.2 AMENORRHEA: ICD-10-CM

## 2023-10-20 LAB — B-HCG SERPL-ACNC: 3509 MIU/ML (ref 0–5)

## 2023-10-20 PROCEDURE — 84702 CHORIONIC GONADOTROPIN TEST: CPT

## 2023-10-20 PROCEDURE — 36415 COLL VENOUS BLD VENIPUNCTURE: CPT

## 2023-10-20 NOTE — TELEPHONE ENCOUNTER
Patient called states early pregnancy, has annual scheduled for Monday. Patient complaining of random intense cramping on a scale of 1-10 around a 5. Patient denies any vaginal bleeding. Patient states trying to stay hydrated. Is it okay to wait until Monday to be seen? What do you recommend?

## 2023-10-20 NOTE — TELEPHONE ENCOUNTER
Patient to go for hCG today repeated on Sunday  Patient to take Tylenol if symptom is not improving and pain is sharp need to go to the ER for evaluation  To keep her appointment as scheduled on Monday

## 2023-10-23 ENCOUNTER — APPOINTMENT (OUTPATIENT)
Dept: LAB | Age: 28
End: 2023-10-23
Payer: COMMERCIAL

## 2023-10-23 ENCOUNTER — ANNUAL EXAM (OUTPATIENT)
Dept: OBGYN CLINIC | Facility: CLINIC | Age: 28
End: 2023-10-23
Payer: COMMERCIAL

## 2023-10-23 VITALS
BODY MASS INDEX: 33.63 KG/M2 | WEIGHT: 166.8 LBS | HEIGHT: 59 IN | SYSTOLIC BLOOD PRESSURE: 118 MMHG | DIASTOLIC BLOOD PRESSURE: 70 MMHG

## 2023-10-23 DIAGNOSIS — Z01.419 WOMEN'S ANNUAL ROUTINE GYNECOLOGICAL EXAMINATION: Primary | ICD-10-CM

## 2023-10-23 DIAGNOSIS — Z11.3 SCREENING FOR STDS (SEXUALLY TRANSMITTED DISEASES): ICD-10-CM

## 2023-10-23 DIAGNOSIS — N91.2 AMENORRHEA: ICD-10-CM

## 2023-10-23 LAB — B-HCG SERPL-ACNC: 8439 MIU/ML (ref 0–5)

## 2023-10-23 PROCEDURE — 36415 COLL VENOUS BLD VENIPUNCTURE: CPT

## 2023-10-23 PROCEDURE — 87491 CHLMYD TRACH DNA AMP PROBE: CPT | Performed by: OBSTETRICS & GYNECOLOGY

## 2023-10-23 PROCEDURE — 87591 N.GONORRHOEAE DNA AMP PROB: CPT | Performed by: OBSTETRICS & GYNECOLOGY

## 2023-10-23 PROCEDURE — S0612 ANNUAL GYNECOLOGICAL EXAMINA: HCPCS | Performed by: OBSTETRICS & GYNECOLOGY

## 2023-10-23 PROCEDURE — 84702 CHORIONIC GONADOTROPIN TEST: CPT

## 2023-10-23 NOTE — PROGRESS NOTES
Subjective      Blair Childress is a 33 y.o. female who presents for annual well woman exam. Periods are regular every 28-30 days, lasting 5 days. No intermenstrual bleeding, spotting, or discharge. Current contraception: none    Lmp 8/10/2023 pos pregnancy test 2w ago   History of abnormal Pap smear: no  Family history of uterine or ovarian cancer: no    Family history of breast cancer: no    Menstrual History:  OB History          2    Para        Term                AB   2    Living             SAB        IAB        Ectopic        Multiple        Live Births                    Patient's last menstrual period was 2023 (exact date). The following portions of the patient's history were reviewed and updated as appropriate: allergies, current medications, past family history, past medical history, past social history, past surgical history, and problem list.    Review of Systems  Review of Systems   Constitutional:  Negative for activity change, appetite change, chills, fatigue and fever. Respiratory:  Negative for apnea, cough, chest tightness and shortness of breath. Cardiovascular:  Negative for chest pain, palpitations and leg swelling. Gastrointestinal:  Positive for nausea. Negative for abdominal pain, constipation, diarrhea and vomiting. Genitourinary:  Negative for difficulty urinating, dysuria, flank pain, frequency, hematuria and urgency. Neurological:  Negative for dizziness, seizures, syncope, light-headedness, numbness and headaches. Psychiatric/Behavioral:  Negative for agitation and confusion.            Objective      /70 (BP Location: Left arm, Patient Position: Sitting, Cuff Size: Adult)   Ht 4' 11" (1.499 m)   Wt 75.7 kg (166 lb 12.8 oz)   LMP 2023 (Exact Date)   BMI 33.69 kg/m²     Physical Exam  OBGyn Exam     General:   alert and oriented, in no acute distress, alert, appears stated age, and cooperative   Heart: regular rate and rhythm, S1, S2 normal, no murmur, click, rub or gallop   Lungs: clear to auscultation bilaterally   Abdomen: soft, non-tender, without masses or organomegaly   Vulva: normal   Vagina: normal mucosa, normal discharge   Cervix: no cervical motion tenderness and no lesions   Uterus: normal size   Adnexa:  Breast Exam:  normal adnexa  breasts appear normal, no suspicious masses, no skin or nipple changes or axillary nodes. Ultrasound performed bedside gestational sac 5 weeks and 5 days small fetal pole         Assessment      @well woman@ . 32 yo female   Annual exam    [id number] ([facility] , second one very early  Miscarriage )  Amenorrhea/positive pregnancy test  5 weeks and 5 days gestational sac noted on ultrasound today  Plan   Pap/r -2022  GC/CT  Diet/exercsie  pnv daily  Vitamin B6 for nausea  Return to office in 2 weeks for viability scan       All questions answered. There are no Patient Instructions on file for this visit.

## 2023-10-24 LAB
C TRACH DNA SPEC QL NAA+PROBE: NEGATIVE
N GONORRHOEA DNA SPEC QL NAA+PROBE: NEGATIVE

## 2023-10-31 ENCOUNTER — OFFICE VISIT (OUTPATIENT)
Dept: OBGYN CLINIC | Facility: CLINIC | Age: 28
End: 2023-10-31
Payer: COMMERCIAL

## 2023-10-31 VITALS
SYSTOLIC BLOOD PRESSURE: 124 MMHG | BODY MASS INDEX: 32.86 KG/M2 | HEIGHT: 59 IN | DIASTOLIC BLOOD PRESSURE: 72 MMHG | WEIGHT: 163 LBS

## 2023-10-31 DIAGNOSIS — O20.0 THREATENED MISCARRIAGE: Primary | ICD-10-CM

## 2023-10-31 PROCEDURE — 76815 OB US LIMITED FETUS(S): CPT | Performed by: OBSTETRICS & GYNECOLOGY

## 2023-10-31 RX ORDER — ONDANSETRON 4 MG/1
4 TABLET, ORALLY DISINTEGRATING ORAL EVERY 8 HOURS PRN
COMMUNITY
Start: 2023-10-30 | End: 2023-11-09

## 2023-10-31 NOTE — PROGRESS NOTES
Assessment/Plan:     There are no diagnoses linked to this encounter. 34 yo female     ([address] [facility] , second one very early  Miscarriage )  Amenorrhea/positive pregnancy test  6 weeks and 3 days gestational sac noted on ultrasound today  Plan   Pap/r -  pnv daily  Vitamin B6/Reglan for nausea  Return to office in 2 weeks for viability scan  Possibility of threatened miscarriage reviewed and discussed with patient pelvic rest recommended      Subjective:      Patient ID: Patito Davis is a 33 y.o. female. HPI  30-year-old female present to the office today complaining of vaginal discharge  Patient also was recently diagnosed with food poisoning was in the emergency room secondary to episode of nausea and vomiting denies any diarrhea or constipation this happened after eating chicken sandwich  Patient said the vaginal discharge happened after sexual activity with pink tinge blood in denies any heavy vaginal bleeding denies any sharp pelvic pain  Patient said her nausea and vomiting improving with Zofran recommend to continue taking the medication and increase hydration  The following portions of the patient's history were reviewed and updated as appropriate: allergies, current medications, past family history, past medical history, past social history, past surgical history and problem list.    Review of Systems      Objective:      /72 (BP Location: Left arm, Patient Position: Sitting, Cuff Size: Adult)   Ht 4' 11" (1.499 m)   Wt 73.9 kg (163 lb)   LMP 2023 (Exact Date)   BMI 32.92 kg/m²          Physical Exam  Constitutional:       Appearance: She is well-developed. Abdominal:      General: There is no distension. Palpations: Abdomen is soft. Tenderness: There is no abdominal tenderness. Genitourinary:     Labia:         Right: No rash, tenderness or lesion. Left: No rash, tenderness or lesion. Vagina: No signs of injury.  No vaginal discharge, erythema or tenderness. Cervix: No cervical motion tenderness, discharge or friability. Adnexa:         Right: No mass, tenderness or fullness. Left: No mass, tenderness or fullness. Comments: Mucus bloody discharge noted  Bedside ultrasound performed intrauterine gestational sac 6 weeks and 3 days noted with a small fetal pole no fetal heart rate yet noted patient instructed to return to office in 2 weeks for viability scan  Neurological:      Mental Status: She is alert and oriented to person, place, and time.    Psychiatric:         Behavior: Behavior normal.

## 2023-11-01 ENCOUNTER — TELEPHONE (OUTPATIENT)
Dept: OBGYN CLINIC | Facility: CLINIC | Age: 28
End: 2023-11-01

## 2023-11-01 NOTE — TELEPHONE ENCOUNTER
I spoke to the patient patient is feeling much better having some vaginal spotting no heavy bleeding has appointment scheduled for follow-up on the ninth recommend to keep herself hydrated and keep her appointment as scheduled keep us updated if any change in her symptoms      ----- Message from Raj Sanchez sent at 11/1/2023 10:01 AM EDT -----  Regarding: FW: Yellow Discharge   Contact: [phone]    ----- Message -----  From: Preet Pandya  Sent: 11/1/2023  12:13 AM EDT  To: #  Subject: Yellow Discharge                                 Dr. Dick Bhandari,     I’m currently in the ER. I’m still not feeling well and I’ve started passing little blood clots and tissue. Giancarlo Sidney also been bleeding a little and it smells bad. Something is wrong, I hope they find out what tonight. I believe I’m miscarrying. I feel physically awful and I can’t keep anything down. They’re going to do a pelvic exam and they’re in the process of doing tests on blood samples and a urine sample.      I feel   -dizzy  -nauseous   -chills   -stomach ache   -short of breathe   -Constipated     Alexandra Huerta

## 2023-11-09 ENCOUNTER — OFFICE VISIT (OUTPATIENT)
Dept: OBGYN CLINIC | Facility: CLINIC | Age: 28
End: 2023-11-09

## 2023-11-09 VITALS
DIASTOLIC BLOOD PRESSURE: 72 MMHG | SYSTOLIC BLOOD PRESSURE: 118 MMHG | HEIGHT: 59 IN | WEIGHT: 164.2 LBS | BODY MASS INDEX: 33.1 KG/M2

## 2023-11-09 DIAGNOSIS — O21.9 NAUSEA AND VOMITING DURING PREGNANCY: ICD-10-CM

## 2023-11-09 DIAGNOSIS — O20.0 THREATENED ABORTION: Primary | ICD-10-CM

## 2023-11-09 RX ORDER — ONDANSETRON 4 MG/1
4 TABLET, ORALLY DISINTEGRATING ORAL EVERY 8 HOURS PRN
Qty: 30 TABLET | Refills: 1 | Status: SHIPPED | OUTPATIENT
Start: 2023-11-09 | End: 2023-11-19

## 2023-11-09 RX ORDER — PYRIDOXINE HCL (VITAMIN B6) 25 MG
25 TABLET ORAL DAILY PRN
COMMUNITY
Start: 2023-11-01

## 2023-11-09 NOTE — PROGRESS NOTES
Assessment/Plan:     Diagnoses and all orders for this visit:    Threatened   -     Progesterone; Future    Nausea and vomiting during pregnancy  -     ondansetron (ZOFRAN-ODT) 4 mg disintegrating tablet; Take 1 tablet (4 mg total) by mouth every 8 (eight) hours as needed for vomiting or nausea for up to 10 days    Other orders  -     doxylamine (UNISOM) 25 MG tablet; Take 25 mg by mouth daily as needed (Patient not taking: Reported on 2023)  -     Ferrous Sulfate (IRON PO); Take 1 tablet by mouth daily  -     pyridoxine (B-6) 25 MG tablet; Take 25 mg by mouth daily as needed      34 yo female     ([facility] , second one very early  Miscarriage )  Intrauterine pregnancy 6 weeks and 6 days by CRL viable pregnancy  Blood type positive  Threatened AB  Plan   Pap/r -  GC/CT negative  Diet/exercsie  Progesterone level  pnv daily  Zofran for nausea and vomiting  To be seen for OB intake  Subjective:      Patient ID: Virginia Number is a 33 y.o. female. HPI  Patient seen evaluated here today for follow up   Patient still having vaginal spotting denies any heavy vaginal bleeding denies any passing clots or tissue  Complaining of nausea and vomiting that respond to Zofran desires to continue medication  Denies any pelvic pain  Denies any urgency frequency or dysuria        The following portions of the patient's history were reviewed and updated as appropriate: allergies, current medications, past family history, past medical history, past social history, past surgical history and problem list.    Review of Systems      Objective:      /72 (BP Location: Left arm, Patient Position: Sitting, Cuff Size: Adult)   Ht 4' 11" (1.499 m)   Wt 74.5 kg (164 lb 3.2 oz)   LMP 2023 (Exact Date)   BMI 33.16 kg/m²          Physical Exam  Constitutional:       Appearance: She is well-developed. Abdominal:      General: There is no distension. Palpations: Abdomen is soft. Tenderness: There is no abdominal tenderness. Genitourinary:     Labia:         Right: No rash, tenderness or lesion. Left: No rash, tenderness or lesion. Vagina: No signs of injury. No vaginal discharge, erythema or tenderness. Cervix: No cervical motion tenderness, discharge or friability. Adnexa:         Right: No mass, tenderness or fullness. Left: No mass, tenderness or fullness. Comments: Anna Venkata discharge noted at the level of the os  Cervical os noted to be closed  Bedside ultrasound performed intrauterine pregnancy 6 weeks and 6 days by CRL fetal heart rate 145 bpm  Neurological:      Mental Status: She is alert and oriented to person, place, and time.    Psychiatric:         Behavior: Behavior normal.

## 2023-11-11 ENCOUNTER — APPOINTMENT (OUTPATIENT)
Dept: LAB | Age: 28
End: 2023-11-11
Payer: COMMERCIAL

## 2023-11-11 DIAGNOSIS — O20.0 THREATENED ABORTION: ICD-10-CM

## 2023-11-11 LAB — PROGEST SERPL-MCNC: 12.05 NG/ML

## 2023-11-11 PROCEDURE — 84144 ASSAY OF PROGESTERONE: CPT

## 2023-11-11 PROCEDURE — 36415 COLL VENOUS BLD VENIPUNCTURE: CPT

## 2023-11-12 DIAGNOSIS — R79.89 LOW SERUM PROGESTERONE: Primary | ICD-10-CM

## 2023-11-12 RX ORDER — PROGESTERONE 200 MG/1
1 CAPSULE ORAL DAILY
Qty: 30 CAPSULE | Refills: 2 | Status: SHIPPED | OUTPATIENT
Start: 2023-11-12

## 2023-11-22 ENCOUNTER — INITIAL PRENATAL (OUTPATIENT)
Dept: OBGYN CLINIC | Facility: CLINIC | Age: 28
End: 2023-11-22

## 2023-11-22 VITALS
BODY MASS INDEX: 33.67 KG/M2 | HEIGHT: 59 IN | DIASTOLIC BLOOD PRESSURE: 78 MMHG | SYSTOLIC BLOOD PRESSURE: 122 MMHG | WEIGHT: 167 LBS

## 2023-11-22 DIAGNOSIS — Z36.9 ENCOUNTER FOR ANTENATAL SCREENING: Primary | ICD-10-CM

## 2023-11-22 DIAGNOSIS — Z31.430 ENCOUNTER OF FEMALE FOR TESTING FOR GENETIC DISEASE CARRIER STATUS FOR PROCREATIVE MANAGEMENT: ICD-10-CM

## 2023-11-22 PROCEDURE — OBC

## 2023-11-22 NOTE — PROGRESS NOTES
.OB Intake    Patient presents for OB intake interview. Accompanied by: FOB   planned pregnancy, FOB involved and supportive. [id number]    Hx of  delivery prior to 36 weeks 6 days: no  Hx of hypertension:  no   Patient's last menstrual period was 2023 (exact date). Estimated Date of Delivery: 2024  Signs and Symptoms of pregnancy:  morning sickness and nausea  Constipation: yes  Headaches: yes  Cramping/spotting: yes  PICA cravings: yes  Cats:  Diabetes:   History of gestational diabetes no  BMI >35  no  Advance maternal age >35 no  First degree relative with type 2 diabetes yes  History of PCOS no  Current metformin use no  Prior history of macrosomia or LGA no    Prenatal labs including: CBC,ABO, Rubella, Hepatitis, RPR,HIV, Cystic fibrosis and spinal muscular atrophy carrier screen, varicella, hemoglobin electrophoresis,toxoplasmosis  Last Pap:  2022  Tdap - counseled to be given after 27 weeks  Influenza vaccine discussed and information sheet given. Vaccinated: NO  COVId Vaccine :NO  Hx of MRSA: yes  Dental visit with last 6 months - yes  , recommendations discussed. Negative for depression with PHQ2 score of 2., score 4   Interview education:  Melodie Malik Pregnancy Essentials booklet reviewed and explained. Handouts given at today's visit. Melodie Malik Baby & me phone application guide   [facility] Baby & Me support center   Melodie Malik Maternal Fetal Medicine        Sequential screening pamphlet                   Cystic fibrosis information    [facility]: Yes  Marce Amaro form submitted:  If no, explain: Commercial insurance     Mychart activated: YES    Interview done by : Neeta Boudreaux LPN       52

## 2023-11-29 ENCOUNTER — TELEPHONE (OUTPATIENT)
Dept: LAB | Facility: HOSPITAL | Age: 28
End: 2023-11-29

## 2023-11-29 ENCOUNTER — APPOINTMENT (OUTPATIENT)
Dept: LAB | Age: 28
End: 2023-11-29
Payer: COMMERCIAL

## 2023-11-29 DIAGNOSIS — Z36.9 ENCOUNTER FOR ANTENATAL SCREENING: ICD-10-CM

## 2023-11-29 DIAGNOSIS — Z31.430 ENCOUNTER OF FEMALE FOR TESTING FOR GENETIC DISEASE CARRIER STATUS FOR PROCREATIVE MANAGEMENT: ICD-10-CM

## 2023-11-29 LAB
ABO GROUP BLD: NORMAL
AMORPH URATE CRY URNS QL MICRO: ABNORMAL
BACTERIA UR QL AUTO: ABNORMAL /HPF
BASOPHILS # BLD AUTO: 0.03 THOUSANDS/ÂΜL (ref 0–0.1)
BASOPHILS NFR BLD AUTO: 0 % (ref 0–1)
BILIRUB UR QL STRIP: NEGATIVE
BLD GP AB SCN SERPL QL: NEGATIVE
CLARITY UR: ABNORMAL
COLOR UR: ABNORMAL
EOSINOPHIL # BLD AUTO: 0.13 THOUSAND/ÂΜL (ref 0–0.61)
EOSINOPHIL NFR BLD AUTO: 1 % (ref 0–6)
ERYTHROCYTE [DISTWIDTH] IN BLOOD BY AUTOMATED COUNT: 13.5 % (ref 11.6–15.1)
GLUCOSE UR STRIP-MCNC: NEGATIVE MG/DL
HCT VFR BLD AUTO: 41.3 % (ref 34.8–46.1)
HGB BLD-MCNC: 14.5 G/DL (ref 11.5–15.4)
HGB UR QL STRIP.AUTO: NEGATIVE
HIV 1+2 AB+HIV1 P24 AG SERPL QL IA: NORMAL
HIV 2 AB SERPL QL IA: NORMAL
HIV1 AB SERPL QL IA: NORMAL
HIV1 P24 AG SERPL QL IA: NORMAL
IMM GRANULOCYTES # BLD AUTO: 0.07 THOUSAND/UL (ref 0–0.2)
IMM GRANULOCYTES NFR BLD AUTO: 1 % (ref 0–2)
KETONES UR STRIP-MCNC: NEGATIVE MG/DL
LEUKOCYTE ESTERASE UR QL STRIP: NEGATIVE
LYMPHOCYTES # BLD AUTO: 2.55 THOUSANDS/ÂΜL (ref 0.6–4.47)
LYMPHOCYTES NFR BLD AUTO: 19 % (ref 14–44)
MCH RBC QN AUTO: 31.9 PG (ref 26.8–34.3)
MCHC RBC AUTO-ENTMCNC: 35.1 G/DL (ref 31.4–37.4)
MCV RBC AUTO: 91 FL (ref 82–98)
MONOCYTES # BLD AUTO: 0.94 THOUSAND/ÂΜL (ref 0.17–1.22)
MONOCYTES NFR BLD AUTO: 7 % (ref 4–12)
NEUTROPHILS # BLD AUTO: 9.48 THOUSANDS/ÂΜL (ref 1.85–7.62)
NEUTS SEG NFR BLD AUTO: 72 % (ref 43–75)
NITRITE UR QL STRIP: NEGATIVE
NON-SQ EPI CELLS URNS QL MICRO: ABNORMAL /HPF
NRBC BLD AUTO-RTO: 0 /100 WBCS
PH UR STRIP.AUTO: 5.5 [PH]
PLATELET # BLD AUTO: 288 THOUSANDS/UL (ref 149–390)
PMV BLD AUTO: 12 FL (ref 8.9–12.7)
PROT UR STRIP-MCNC: ABNORMAL MG/DL
RBC # BLD AUTO: 4.54 MILLION/UL (ref 3.81–5.12)
RBC #/AREA URNS AUTO: ABNORMAL /HPF
RH BLD: POSITIVE
RUBV IGG SERPL IA-ACNC: <10 IU/ML
SP GR UR STRIP.AUTO: 1.02 (ref 1–1.03)
SPECIMEN EXPIRATION DATE: NORMAL
TREPONEMA PALLIDUM IGG+IGM AB [PRESENCE] IN SERUM OR PLASMA BY IMMUNOASSAY: NORMAL
UROBILINOGEN UR STRIP-ACNC: <2 MG/DL
VZV IGG SER QL IA: NORMAL
WBC # BLD AUTO: 13.2 THOUSAND/UL (ref 4.31–10.16)
WBC #/AREA URNS AUTO: ABNORMAL /HPF

## 2023-11-29 PROCEDURE — 85025 COMPLETE CBC W/AUTO DIFF WBC: CPT

## 2023-11-29 PROCEDURE — 87389 HIV-1 AG W/HIV-1&-2 AB AG IA: CPT

## 2023-11-29 PROCEDURE — 86706 HEP B SURFACE ANTIBODY: CPT

## 2023-11-29 PROCEDURE — 86762 RUBELLA ANTIBODY: CPT

## 2023-11-29 PROCEDURE — 86850 RBC ANTIBODY SCREEN: CPT

## 2023-11-29 PROCEDURE — 86787 VARICELLA-ZOSTER ANTIBODY: CPT

## 2023-11-29 PROCEDURE — 87340 HEPATITIS B SURFACE AG IA: CPT

## 2023-11-29 PROCEDURE — 36415 COLL VENOUS BLD VENIPUNCTURE: CPT

## 2023-11-29 PROCEDURE — 86900 BLOOD TYPING SEROLOGIC ABO: CPT

## 2023-11-29 PROCEDURE — 86901 BLOOD TYPING SEROLOGIC RH(D): CPT

## 2023-11-29 PROCEDURE — 87086 URINE CULTURE/COLONY COUNT: CPT

## 2023-11-29 PROCEDURE — 86780 TREPONEMA PALLIDUM: CPT

## 2023-11-29 PROCEDURE — 83020 HEMOGLOBIN ELECTROPHORESIS: CPT

## 2023-11-29 PROCEDURE — 81001 URINALYSIS AUTO W/SCOPE: CPT | Performed by: OBSTETRICS & GYNECOLOGY

## 2023-11-29 PROCEDURE — 86803 HEPATITIS C AB TEST: CPT

## 2023-11-30 ENCOUNTER — ULTRASOUND (OUTPATIENT)
Age: 28
End: 2023-11-30
Payer: COMMERCIAL

## 2023-11-30 VITALS
DIASTOLIC BLOOD PRESSURE: 64 MMHG | SYSTOLIC BLOOD PRESSURE: 116 MMHG | WEIGHT: 170.6 LBS | HEIGHT: 59 IN | BODY MASS INDEX: 34.39 KG/M2 | HEART RATE: 80 BPM

## 2023-11-30 DIAGNOSIS — Z3A.11 11 WEEKS GESTATION OF PREGNANCY: ICD-10-CM

## 2023-11-30 DIAGNOSIS — Z36.9 ENCOUNTER FOR ANTENATAL SCREENING: ICD-10-CM

## 2023-11-30 DIAGNOSIS — O99.211 OBESITY DURING PREGNANCY IN FIRST TRIMESTER: Primary | ICD-10-CM

## 2023-11-30 DIAGNOSIS — Z36.82 NUCHAL TRANSLUCENCY OF FETUS ON PRENATAL ULTRASOUND: ICD-10-CM

## 2023-11-30 DIAGNOSIS — Z31.430 ENCOUNTER OF FEMALE FOR TESTING FOR GENETIC DISEASE CARRIER STATUS FOR PROCREATIVE MANAGEMENT: ICD-10-CM

## 2023-11-30 LAB
HBV SURFACE AB SER-ACNC: 13.8 MIU/ML
HBV SURFACE AG SER QL: NORMAL
HCV AB SER QL: NORMAL

## 2023-11-30 PROCEDURE — 76813 OB US NUCHAL MEAS 1 GEST: CPT | Performed by: OBSTETRICS & GYNECOLOGY

## 2023-11-30 PROCEDURE — 76801 OB US < 14 WKS SINGLE FETUS: CPT | Performed by: OBSTETRICS & GYNECOLOGY

## 2023-11-30 PROCEDURE — 99244 OFF/OP CNSLTJ NEW/EST MOD 40: CPT | Performed by: OBSTETRICS & GYNECOLOGY

## 2023-11-30 PROCEDURE — 36415 COLL VENOUS BLD VENIPUNCTURE: CPT | Performed by: OBSTETRICS & GYNECOLOGY

## 2023-11-30 NOTE — PROGRESS NOTES
Patient chose to have [facility] Non-invasive Prenatal Screen with fetal sex. Patient choose self-pay option. Patient assistance program (PAP) application provided to patient no. PAP sent with specimen No.     Patient given brochure and is aware [facility] will contact their insurance and coordinate coverage. Patient made aware she will receive a text message and e-mail from [employer]. Patient informed text/phone call message will come from area code  "415". Provided [employer] # [phone] and web site at [employer].   "[place] your test online" card with barcode and test tube ID provided to patient. Reviewed [employer] web site states 5-7 business days for results via their portal.   [employer] message will be sent to patient when M receives results /provider reviews. 2 vials of blood drawn from  left  arm by JAYNE Mistry RN. Patient tolerated blood draw without difficulty. Specimens labeled with patient identifiers (name, date of birth, specimen collection date), order and specimen were verified with patient, packed and sent via [facility]. FED EX  tracking #  [id number]  Copy of lab order scanned to Epic media. Maternal Fetal Medicine will have results in approximately 7-10 business days and will call patient or notify via [address] [place] [address]. Patient aware viewing lab result online will reveal fetal sex if ordered. Patient verbalized understanding of all instructions and no questions at this time.

## 2023-11-30 NOTE — LETTER
2023     Maday Mireles [facility]  [address] [facility] [address] [facility]    Patient: Sanaz Castro   YOB: 1995   Date of Visit: 2023       Dear Dr. Rosa Salcedo: Thank you for referring Sanaz Castro to me for evaluation. Below are my notes for this consultation. If you have questions, please do not hesitate to call me. I look forward to following your patient along with you. Sincerely,        Dusty Toscano MD        CC: No Recipients    Dusty Toscano MD  2023  5:01 PM  Sign when Signing Visit  OFFICE CONSULT  Referring physician:   Lindsay Yuan Md  [address] [place] [address]  [address] [facility],  [address] [facility]      Dear Dr. Rosa Salcedo      Thank you for requesting a  consultation on your patient Ms. Sanaz Castro for the following indications: Dating    History  Medications: Prenatal vitamins, vitamin B6 and oral progesterone 200 mg daily for low progesterone levels per patient. Progesterone level in pregnancy was 12.05 on 23  Allergies to medications: none  Past medical history: Rubella nonimmune, asthma not currently requiring medication, history of anxiety not requiring medical treatment, obesity based on an early pregnancy bmi of 35. Past surgical history: Tongue biopsy, wisdom tooth extraction  Past obstetrical history: 2 prior miscarriages with a blighted ovum in 2020 and a possible second pregnancy in 2022 when there was a faint line noted on a home pregnancy test with bleeding soon after. In  she has a normal TSH and hgba1c. Social history: Denies substance use  First generation family history: Her mother has diabetes and hypertension and a brother has diabetes    Ultrasound findings: The ultrasound shows a fetus that is measuring 9 days ahead of her ZIGGY by her 6 week scan. The nasal bone and nuchal translucency appears normal. No malformations are seen on today's early ultrasound.      The patient was informed of the findings and counseled about the limitations of the exam in detecting all forms of fetal congenital abnormalities. She does not report any vaginal bleeding or uterine cramping or contractions. Specific counseling was provided on the following problems: We discussed the options for genetic screening which include invasive testing on the fetal placenta or on fetal skin cells within the amniotic fluid and compared this to noninvasive testing which includes cell free DNA screening. We reviewed the risks, the benefits and the limitations of each. In the end patient chose to complete the cell free DNA screen. Family hx of diabetes. Recommend early diabetes screening. Recommend a hgba1c and fasting blood sugar through her ob office with her 16 week labs. She still has blood work for [employer] and CF carrier screening that needs to be completed. Pregnant women with a BMI>30 ideally should aim for maximum weight gain of 11-20 pounds ideally via healthy diet and regular exercise. Maternal BMI above 30 in pregnancy confers increased risks of preeclampsia, GDM, cardiac dysfunction, sleep apnea,  delivery, and VTE, and fetal risks include miscarriage, fetal anomalies (along with reduced detection), and stillbirth, macrosomia and impaired growth. Growth assessment is advised in the third trimester. Baby aspirin 162 milligrams orally taken till 36w0d has been associated with a lower risk of developing preeclampsia in pregnancy. Her pregnancy measurement today is measuring 9 days ahead of her bernadine by her 6 week scan and more then a month off her her BERNADINE by a LMP suggesting that we should use todays US for her BERNADINE which would be 23. Future tests recommended: The results of her NIPT will return in 7-10 days. Screening for spina bifida with an MSAFP screen is a future test that can be prescribed through her OB office.   This blood work should be drawn preferably at 16 to 18 weeks so that the results return prior to her next scan. The test though can be run until 21 weeks and 6 days if needed. Recommend a hgba1c and fasting blood sugar through her ob office with her 16 week labs due to her family history of diabetes. She was last screened for diabetes 12/27/21 with a hgba1c of 5.3. Future ultrasounds ordered today:   Fetal Level II ultrasound imaging is recommended at 19-20 weeks' gestation. Her 11 week NT scan was limited by the early gestational age. Will have her complete a better one at 13-14 weeks weeks along with growth to assure that her final ZIGGY of 6/19/23 that I am recommending today is accurate. Pre visit time reviewing her records   15 minutes  Face to face time 12 minutes  Post visit time on documentation of note, updating her problem list, adding orders and prescriptions 25 minutes. Procedures that were completed today were charged separately. The level of decision making was moderate complexity.     Hernandez Pang MD

## 2023-12-01 ENCOUNTER — TELEPHONE (OUTPATIENT)
Dept: PERINATAL CARE | Facility: CLINIC | Age: 28
End: 2023-12-01

## 2023-12-01 LAB
BACTERIA UR CULT: NORMAL
HGB A MFR BLD: 2.6 % (ref 1.8–3.2)
HGB A MFR BLD: 97.4 % (ref 96.4–98.8)
HGB F MFR BLD: 0 % (ref 0–2)
HGB FRACT BLD-IMP: NORMAL
HGB S MFR BLD: 0 %

## 2023-12-01 NOTE — TELEPHONE ENCOUNTER
----- Message from Cindi Mitchell MD sent at 11/30/2023  5:02 PM EST -----  Please call patient to set up follow up scans.     Danny Rider

## 2023-12-02 PROBLEM — Z28.39 RUBELLA NON-IMMUNE STATUS, ANTEPARTUM: Status: ACTIVE | Noted: 2023-12-02

## 2023-12-02 PROBLEM — O09.899 RUBELLA NON-IMMUNE STATUS, ANTEPARTUM: Status: ACTIVE | Noted: 2023-12-02

## 2023-12-02 PROBLEM — O99.211 OBESITY DURING PREGNANCY IN FIRST TRIMESTER: Status: ACTIVE | Noted: 2023-12-02

## 2023-12-02 PROBLEM — Z83.3 FAMILY HISTORY OF DIABETES DURING PREGNANCY: Status: ACTIVE | Noted: 2023-12-02

## 2023-12-02 RX ORDER — ASPIRIN 81 MG/1
162 TABLET, CHEWABLE ORAL DAILY
Qty: 180 TABLET | Refills: 2 | Status: SHIPPED | OUTPATIENT
Start: 2023-12-02

## 2023-12-02 NOTE — PROGRESS NOTES
OFFICE CONSULT  Referring physician:   Autumn Badillo Md  [address] [place] [address] [address]   [address],  [address] [facility]      Dear Dr. Radha Rousseau      Thank you for requesting a  consultation on your patient Ms. Giorgio Clinton for the following indications: Dating    History  Medications: Prenatal vitamins, vitamin B6 and oral progesterone 200 mg daily for low progesterone levels per patient. Progesterone level in pregnancy was 12.05 on 23  Allergies to medications: none  Past medical history: Rubella nonimmune, asthma not currently requiring medication, history of anxiety not requiring medical treatment, obesity based on an early pregnancy bmi of 35. Past surgical history: Tongue biopsy, wisdom tooth extraction  Past obstetrical history: 2 prior miscarriages with a blighted ovum in 2020 and a possible second pregnancy in 2022 when there was a faint line noted on a home pregnancy test with bleeding soon after. In  she has a normal TSH and hgba1c. Social history: Denies substance use  First generation family history: Her mother has diabetes and hypertension and a brother has diabetes    Ultrasound findings: The ultrasound shows a fetus that is measuring 9 days ahead of her ZIGGY by her 6 week scan. The nasal bone and nuchal translucency appears normal. No malformations are seen on today's early ultrasound. The patient was informed of the findings and counseled about the limitations of the exam in detecting all forms of fetal congenital abnormalities. She does not report any vaginal bleeding or uterine cramping or contractions. Specific counseling was provided on the following problems: We discussed the options for genetic screening which include invasive testing on the fetal placenta or on fetal skin cells within the amniotic fluid and compared this to noninvasive testing which includes cell free DNA screening. We reviewed the risks, the benefits and the limitations of each.   In the end patient chose to complete the cell free DNA screen. Family hx of diabetes. Recommend early diabetes screening. Recommend a hgba1c and fasting blood sugar through her ob office with her 16 week labs. She still has blood work for [employer] and CF carrier screening that needs to be completed. Pregnant women with a BMI>30 ideally should aim for maximum weight gain of 11-20 pounds ideally via healthy diet and regular exercise. Maternal BMI above 30 in pregnancy confers increased risks of preeclampsia, GDM, cardiac dysfunction, sleep apnea,  delivery, and VTE, and fetal risks include miscarriage, fetal anomalies (along with reduced detection), and stillbirth, macrosomia and impaired growth. Growth assessment is advised in the third trimester. Baby aspirin 162 milligrams orally taken till 36w0d has been associated with a lower risk of developing preeclampsia in pregnancy. Her pregnancy measurement today is measuring 9 days ahead of her bernadine by her 6 week scan and more then a month off her her BERNADINE by a LMP suggesting that we should use todays US for her BERNADINE which would be 23. Future tests recommended: The results of her NIPT will return in 7-10 days. Screening for spina bifida with an MSAFP screen is a future test that can be prescribed through her OB office. This blood work should be drawn preferably at 16 to 18 weeks so that the results return prior to her next scan. The test though can be run until 21 weeks and 6 days if needed. Recommend a hgba1c and fasting blood sugar through her ob office with her 16 week labs due to her family history of diabetes. She was last screened for diabetes 21 with a hgba1c of 5.3. Future ultrasounds ordered today:   Fetal Level II ultrasound imaging is recommended at 19-20 weeks' gestation. Her 11 week NT scan was limited by the early gestational age.  Will have her complete a better one at 13-14 weeks weeks along with growth to assure that her final BERNADINE of 6/19/23 that I am recommending today is accurate. Pre visit time reviewing her records   15 minutes  Face to face time 12 minutes  Post visit time on documentation of note, updating her problem list, adding orders and prescriptions 25 minutes. Procedures that were completed today were charged separately. The level of decision making was moderate complexity.     Tamiko Shrestha MD

## 2023-12-15 ENCOUNTER — ROUTINE PRENATAL (OUTPATIENT)
Facility: HOSPITAL | Age: 28
End: 2023-12-15
Payer: COMMERCIAL

## 2023-12-15 VITALS
BODY MASS INDEX: 33.14 KG/M2 | DIASTOLIC BLOOD PRESSURE: 60 MMHG | WEIGHT: 164.4 LBS | SYSTOLIC BLOOD PRESSURE: 96 MMHG | HEIGHT: 59 IN | HEART RATE: 125 BPM

## 2023-12-15 DIAGNOSIS — Z3A.13 13 WEEKS GESTATION OF PREGNANCY: ICD-10-CM

## 2023-12-15 DIAGNOSIS — O99.211 OBESITY DURING PREGNANCY IN FIRST TRIMESTER: Primary | ICD-10-CM

## 2023-12-15 DIAGNOSIS — Z36.82 ENCOUNTER FOR NUCHAL TRANSLUCENCY TESTING: ICD-10-CM

## 2023-12-15 PROCEDURE — 76801 OB US < 14 WKS SINGLE FETUS: CPT | Performed by: OBSTETRICS & GYNECOLOGY

## 2023-12-15 PROCEDURE — 76813 OB US NUCHAL MEAS 1 GEST: CPT | Performed by: OBSTETRICS & GYNECOLOGY

## 2023-12-15 PROCEDURE — 99213 OFFICE O/P EST LOW 20 MIN: CPT | Performed by: OBSTETRICS & GYNECOLOGY

## 2023-12-15 NOTE — PROGRESS NOTES
Izzy Chambers presents today for a genetic screening ultrasound and nuchal translucency ultrasound. Today's early anatomic evaluation and nuchal translucency evaluation overall reassuring. No significant abnormalities are appreciated or suspected any study that is otherwise limited by gestational age. Izzy Chambers is recently recovering from a episode of gastroenteritis. She is currently feeling better. The patient had noninvasive prenatal screening through [employer]. Her results were negative , placing her in a very low risk category. The sensitivity of detecting Trisomy 21 with this test is 99.99% with a specificity of 99.1%. The sensitivity of detecting Trisomy 18 is 99.99% with a specificity of 99.99%. The sensitivity of detecting Trisomy 13 is 99.99% with a specificity of 99.69%. The sensitivity of detecting monosomy X is 99.99 %with a specificity 99.89%. Her negative result is very reassuring that the likelihood of her having a fetus with the aforementioned aneuploidy is very low. We discussed follow-up in detail and I recommend she return at 20 weeks for detailed fetal anatomic evaluation. Thank you very much for allowing us to participate in the care of this very nice patient. Should you have any questions, please do not hesitate to contact our office. Portions of the record may have been created with voice recognition software. Occasional wrong word or "sound a like" substitutions may have occurred due to the inherent limitations of voice recognition software. Read the chart carefully and recognize, using context, where substitutions have occurred.

## 2023-12-21 ENCOUNTER — ROUTINE PRENATAL (OUTPATIENT)
Dept: OBGYN CLINIC | Facility: CLINIC | Age: 28
End: 2023-12-21
Payer: COMMERCIAL

## 2023-12-21 VITALS
WEIGHT: 169 LBS | SYSTOLIC BLOOD PRESSURE: 110 MMHG | HEIGHT: 59 IN | DIASTOLIC BLOOD PRESSURE: 60 MMHG | BODY MASS INDEX: 34.07 KG/M2

## 2023-12-21 DIAGNOSIS — Z36.9 ANTENATAL SCREENING ENCOUNTER: ICD-10-CM

## 2023-12-21 DIAGNOSIS — Z3A.14 14 WEEKS GESTATION OF PREGNANCY: Primary | ICD-10-CM

## 2023-12-21 DIAGNOSIS — G89.29 CHRONIC BILATERAL LOW BACK PAIN WITHOUT SCIATICA: ICD-10-CM

## 2023-12-21 DIAGNOSIS — M54.50 CHRONIC BILATERAL LOW BACK PAIN WITHOUT SCIATICA: ICD-10-CM

## 2023-12-21 DIAGNOSIS — Z28.39 RUBELLA NON-IMMUNE STATUS, ANTEPARTUM: ICD-10-CM

## 2023-12-21 DIAGNOSIS — R35.0 URINARY FREQUENCY: ICD-10-CM

## 2023-12-21 DIAGNOSIS — O09.899 RUBELLA NON-IMMUNE STATUS, ANTEPARTUM: ICD-10-CM

## 2023-12-21 LAB
SL AMB  POCT GLUCOSE, UA: ABNORMAL
SL AMB LEUKOCYTE ESTERASE,UA: ABNORMAL
SL AMB POCT NITRITE,UA: ABNORMAL
SL AMB POCT URINE PROTEIN: ABNORMAL

## 2023-12-21 PROCEDURE — 87086 URINE CULTURE/COLONY COUNT: CPT | Performed by: PHYSICIAN ASSISTANT

## 2023-12-21 PROCEDURE — PNV: Performed by: PHYSICIAN ASSISTANT

## 2023-12-21 PROCEDURE — 81002 URINALYSIS NONAUTO W/O SCOPE: CPT | Performed by: PHYSICIAN ASSISTANT

## 2023-12-21 NOTE — PROGRESS NOTES
Assessment      Pregnancy 14 and 1/7 weeks     Plan    Routine OB visit first trimester doing well overall  Labs reviewed:  Hemoglobinopathy panel normal, varicella immune, hepatitis B immune, rubella nonimmune, RPR negative, HIV negative, hepatitis C antibody negative, hepatitis B surface antigen negative.  Patient has not yet received a call regarding status of CF/SMA blood work authorization -was advised to notify us if they have not received a call from central scheduling in the next few weeks.    Recommended rubella vaccine PP  MFM note reviewed, early glucose testing recommended: A1C and FBS ordered    Genetic screening tests discussed:  NIPT screening low risk.  Counseled patient and partner regarding AFP screening, excepted and ordered.  To be completed between 15 to 20 weeks. .  Role of ultrasound in pregnancy discussed; fetal survey:  Level 2 schedule 2/2/2024 normal NT ultrasound .    BP today 110/60, urine dip trace leuks, trace protein.    A positive blood type    1 episode of limited postcoital spotting a few days ago, no recurrence.  No leaking of vaginal fluid or pelvic pain or cramping.  Reassurance given.    Patient also noting generalized lower back pain.  She does have pre-existing neuromuscular back pain for which she has worked with a physical therapist a few years ago.  Feels similar but somewhat more directed to the right.  She denies hematuria, fever/chills or dysuria.  She does note increased frequency of urination in setting of pregnancy.  Will have patient collect urine clean-catch to rule out infection just in case.  Tylenol as needed back pain, encourage patient to restart back stretching exercises as tolerated, possible referral to PT if worsens.  Definitely recommend belly band as her abdomen enlarges with her pregnancy.    Patient encouraged to continue PNV daily  Patient and partner had many questions regarding the use of ASA which was discussed in detail with them.  Patient  agreeable to start      Follow up in 4 weeks.  80% of 20 minute visit spent on counseling and coordination of care.  .      Chief Complaint   Patient presents with    Routine Prenatal Visit     Patient did have some vaginal bleeding right after intercourse , but none since no pain         Subjective   Alexandra Huerta is a 28 y.o. female being seen today for her obstetrical visit. She is at 14w1d gestation.  She is doing well overall.  She does note 1 brief episode of postcoital spotting after sexual intercourse a few days ago.  It was only when wiping and spontaneously resolved.  It has not reoccurred since.  Denies any pelvic pain or cramping.  Patient reports no contractions, no cramping, no leaking, and denies severe headache, persistent blurry vision or dizziness, abdominal pain, nausea/vomiting, reflux or swelling denies significant dysuria or constipation.  Denies hematuria, fever or chills. . Fetal movement:  Too early to appreciate. .    She is dealing with some back pain, but pre-existant to her pregnancy, has worked with PT previously and doing exercise.   Some urinary frequency since pregnant but denies urgency, hematuria or dysuria.     Had stomach virus last week. Had some indigestion after but that has since subsided.    N/V in first trimester has subsided.     She is taking prenatal vitamin daily.  Daily progesterone discontinued at 12 weeks  She has not yet started baby aspirin, as some questions regarding use.    Menstrual History:  OB History          3    Para   0    Term   0       0    AB   2    Living   0         SAB   2    IAB   0    Ectopic   0    Multiple   0    Live Births   0                Menarche age: N/A  Patient's last menstrual period was 2023 (exact date).       The following portions of the patient's history were reviewed and updated as appropriate: allergies, current medications, past family history, past medical history, past social history, past surgical history,  "and problem list.    Review of Systems  Pertinent items are noted in HPI.     Objective     /60 (BP Location: Left arm, Patient Position: Sitting, Cuff Size: Standard)   Ht 4' 11\" (1.499 m)   Wt 76.7 kg (169 lb)   LMP 08/11/2023 (Exact Date)   BMI 34.13 kg/m²   Uterine Size: Below umbilicus                       FHT: 145    "

## 2023-12-23 LAB — BACTERIA UR CULT: NORMAL

## 2023-12-29 ENCOUNTER — TELEPHONE (OUTPATIENT)
Facility: HOSPITAL | Age: 28
End: 2023-12-29

## 2023-12-29 NOTE — TELEPHONE ENCOUNTER
Left voicemail informing patient of the time change for her upcoming Maternal Fetal Medicine appointment on 2/2/24. Appointment time has changed from 10:30am to 10:45am. Requested she give our office a call back at [phone] with any questions or if she would need to reschedule.

## 2024-01-02 LAB — MISCELLANEOUS LAB TEST RESULT: NORMAL

## 2024-01-16 ENCOUNTER — ROUTINE PRENATAL (OUTPATIENT)
Dept: OBGYN CLINIC | Facility: CLINIC | Age: 29
End: 2024-01-16

## 2024-01-16 VITALS
HEIGHT: 59 IN | DIASTOLIC BLOOD PRESSURE: 60 MMHG | WEIGHT: 170 LBS | SYSTOLIC BLOOD PRESSURE: 100 MMHG | BODY MASS INDEX: 34.27 KG/M2

## 2024-01-16 DIAGNOSIS — Z28.39 RUBELLA NON-IMMUNE STATUS, ANTEPARTUM: Primary | ICD-10-CM

## 2024-01-16 DIAGNOSIS — O09.899 RUBELLA NON-IMMUNE STATUS, ANTEPARTUM: Primary | ICD-10-CM

## 2024-01-16 DIAGNOSIS — Z3A.27 27 WEEKS GESTATION OF PREGNANCY: ICD-10-CM

## 2024-01-16 PROCEDURE — PNV: Performed by: OBSTETRICS & GYNECOLOGY

## 2024-01-16 NOTE — PROGRESS NOTES
Patient is a 28-year-old  3 para 0-0-2-0 who presents for routine prenatal exam at 17 weeks 6 days gestation.  She has no complaints related to the pregnancy other than some nausea and food aversion.  She has not lost weight since the beginning of the pregnancy.  She appreciates fetal movement, denies loss of fluid, vaginal bleeding, contractions or pain.  She is scheduled for her anatomy scan.  She has fasting blood sugar, hemoglobin A1c, and maternal serum alpha-fetoprotein paperwork and will have the blood work done in the future.  Will see her back in 4 weeks or as needed.

## 2024-01-31 NOTE — PROGRESS NOTES
Please refer to the [facility] ultrasound report in Ob Procedures for additional information regarding today's visit

## 2024-02-02 ENCOUNTER — ROUTINE PRENATAL (OUTPATIENT)
Facility: HOSPITAL | Age: 29
End: 2024-02-02
Payer: COMMERCIAL

## 2024-02-02 VITALS
WEIGHT: 171.2 LBS | DIASTOLIC BLOOD PRESSURE: 56 MMHG | BODY MASS INDEX: 34.51 KG/M2 | HEIGHT: 59 IN | HEART RATE: 88 BPM | SYSTOLIC BLOOD PRESSURE: 104 MMHG

## 2024-02-02 DIAGNOSIS — O99.212 MATERNAL OBESITY, ANTEPARTUM, SECOND TRIMESTER: Primary | ICD-10-CM

## 2024-02-02 DIAGNOSIS — Z3A.20 20 WEEKS GESTATION OF PREGNANCY: ICD-10-CM

## 2024-02-02 DIAGNOSIS — Z36.86 ENCOUNTER FOR ANTENATAL SCREENING FOR CERVICAL LENGTH: ICD-10-CM

## 2024-02-02 PROCEDURE — 76817 TRANSVAGINAL US OBSTETRIC: CPT | Performed by: OBSTETRICS & GYNECOLOGY

## 2024-02-02 PROCEDURE — 99213 OFFICE O/P EST LOW 20 MIN: CPT | Performed by: OBSTETRICS & GYNECOLOGY

## 2024-02-02 PROCEDURE — 76811 OB US DETAILED SNGL FETUS: CPT | Performed by: OBSTETRICS & GYNECOLOGY

## 2024-02-02 NOTE — PROGRESS NOTES
Ultrasound Probe Disinfection    A transvaginal ultrasound was performed.   Prior to use, disinfection was performed with High Level Disinfection Process ([employer]).  Probe serial number [id number]: [id number] was used.      Heather Vang  02/02/24  10:36 AM

## 2024-02-02 NOTE — LETTER
February 2, 2024     Enedina Coughlin MD  [address] [facility]  1st Floor  [place] [id number]    Patient: Alexandra Huerta   YOB: 1995   Date of Visit: 2/2/2024       Dear Dr. Coughlin:    Thank you for referring Alexandra Huerta to me for evaluation. Below are my notes for this consultation.    If you have questions, please do not hesitate to call me. I look forward to following your patient along with you.         Sincerely,        Good Harris MD        CC: No Recipients    Good Harris MD  2/2/2024 10:32 AM  Sign when Signing Visit  Please refer to the [facility] ultrasound report in Ob Procedures for additional information regarding today's visit

## 2024-02-06 ENCOUNTER — ROUTINE PRENATAL (OUTPATIENT)
Dept: OBGYN CLINIC | Facility: CLINIC | Age: 29
End: 2024-02-06
Payer: COMMERCIAL

## 2024-02-06 ENCOUNTER — APPOINTMENT (OUTPATIENT)
Dept: LAB | Facility: CLINIC | Age: 29
End: 2024-02-06
Payer: COMMERCIAL

## 2024-02-06 ENCOUNTER — TELEPHONE (OUTPATIENT)
Dept: OBGYN CLINIC | Facility: CLINIC | Age: 29
End: 2024-02-06

## 2024-02-06 VITALS
HEIGHT: 59 IN | WEIGHT: 176 LBS | BODY MASS INDEX: 35.48 KG/M2 | SYSTOLIC BLOOD PRESSURE: 110 MMHG | DIASTOLIC BLOOD PRESSURE: 68 MMHG

## 2024-02-06 DIAGNOSIS — Z36.9 ENCOUNTER FOR ANTENATAL SCREENING: ICD-10-CM

## 2024-02-06 DIAGNOSIS — Z36.9 ANTENATAL SCREENING ENCOUNTER: ICD-10-CM

## 2024-02-06 DIAGNOSIS — Z3A.14 14 WEEKS GESTATION OF PREGNANCY: ICD-10-CM

## 2024-02-06 DIAGNOSIS — Z31.430 ENCOUNTER OF FEMALE FOR TESTING FOR GENETIC DISEASE CARRIER STATUS FOR PROCREATIVE MANAGEMENT: ICD-10-CM

## 2024-02-06 DIAGNOSIS — Z3A.21 21 WEEKS GESTATION OF PREGNANCY: Primary | ICD-10-CM

## 2024-02-06 DIAGNOSIS — O09.899 RUBELLA NON-IMMUNE STATUS, ANTEPARTUM: ICD-10-CM

## 2024-02-06 DIAGNOSIS — Z28.39 RUBELLA NON-IMMUNE STATUS, ANTEPARTUM: ICD-10-CM

## 2024-02-06 LAB
EST. AVERAGE GLUCOSE BLD GHB EST-MCNC: 105 MG/DL
GLUCOSE P FAST SERPL-MCNC: 75 MG/DL (ref 65–99)
HBA1C MFR BLD: 5.3 %
SL AMB  POCT GLUCOSE, UA: ABNORMAL
SL AMB LEUKOCYTE ESTERASE,UA: ABNORMAL
SL AMB POCT BILIRUBIN,UA: ABNORMAL
SL AMB POCT BLOOD,UA: ABNORMAL
SL AMB POCT CLARITY,UA: CLEAR
SL AMB POCT COLOR,UA: YELLOW
SL AMB POCT KETONES,UA: ABNORMAL
SL AMB POCT NITRITE,UA: ABNORMAL
SL AMB POCT PH,UA: 6.5
SL AMB POCT SPECIFIC GRAVITY,UA: 1.01
SL AMB POCT URINE PROTEIN: ABNORMAL
SL AMB POCT UROBILINOGEN: ABNORMAL

## 2024-02-06 PROCEDURE — 83036 HEMOGLOBIN GLYCOSYLATED A1C: CPT

## 2024-02-06 PROCEDURE — 81329 SMN1 GENE DOS/DELETION ALYS: CPT

## 2024-02-06 PROCEDURE — 81002 URINALYSIS NONAUTO W/O SCOPE: CPT | Performed by: OBSTETRICS & GYNECOLOGY

## 2024-02-06 PROCEDURE — PNV: Performed by: OBSTETRICS & GYNECOLOGY

## 2024-02-06 PROCEDURE — 36415 COLL VENOUS BLD VENIPUNCTURE: CPT

## 2024-02-06 PROCEDURE — 82947 ASSAY GLUCOSE BLOOD QUANT: CPT

## 2024-02-06 PROCEDURE — 82105 ALPHA-FETOPROTEIN SERUM: CPT

## 2024-02-06 NOTE — PROGRESS NOTES
Patient is a 28-year-old  3 para 0-0-2-0 at 20 weeks 6 days gestation who presents after having felt strong fetal movements for several weeks with feeling no fetal movement for the last 24 hours.  Patient had a normal anatomy scan for 5 days ago.  She has a good fetal heart rate of 152 and on ultrasound there is abundant fetal movement of all 4 extremities with good fetal tone and adequate amniotic fluid.  She does have an anterior placenta and she was reassured that it is not unusual not to have consistently strong fetal movement this early in pregnancy.  She is scheduled for recheck 4 weeks from her previous visit.

## 2024-02-08 LAB
2ND TRIMESTER 4 SCREEN SERPL-IMP: NORMAL
AFP ADJ MOM SERPL: 0.91
AFP INTERP AMN-IMP: NORMAL
AFP INTERP SERPL-IMP: NORMAL
AFP INTERP SERPL-IMP: NORMAL
AFP SERPL-MCNC: 54.6 NG/ML
AGE AT DELIVERY: 30.2 YR
GA METHOD: NORMAL
GA: 20.9 WEEKS
IDDM PATIENT QL: NO
MULTIPLE PREGNANCY: NO
NEURAL TUBE DEFECT RISK FETUS: NORMAL %

## 2024-02-14 ENCOUNTER — ROUTINE PRENATAL (OUTPATIENT)
Dept: OBGYN CLINIC | Facility: CLINIC | Age: 29
End: 2024-02-14

## 2024-02-14 VITALS
DIASTOLIC BLOOD PRESSURE: 62 MMHG | WEIGHT: 170 LBS | BODY MASS INDEX: 34.27 KG/M2 | HEIGHT: 59 IN | SYSTOLIC BLOOD PRESSURE: 110 MMHG

## 2024-02-14 DIAGNOSIS — Z3A.22 22 WEEKS GESTATION OF PREGNANCY: Primary | ICD-10-CM

## 2024-02-14 PROCEDURE — PNV: Performed by: PHYSICIAN ASSISTANT

## 2024-02-14 NOTE — PROGRESS NOTES
Assessment     Pregnancy 22 and 0/7 weeks     Plan    Routine OB visit 2nd trimester doing well overall  She has no concerns or complaints today.    Pt completed early glucose/A1C testing which was normal.  CF/SMA screen BW pending.    AFP screen negative.    Level II Us performed with MFM w/ normal appearing anatomy. Limited due to fetal position.  Recommended f/u missed anatomy scheduled 3/29     BP today 110/62, , FM appreciated on exam  Pt did not leave urine sample before leaving office today  A positive blood type      Patient encouraged to continue PNV daily  Patient to continue ASA daily, to d/c at 36 wks.     Stay well hydrated, well balanced diet, exercise as tolerated.    RTO in 3-4 wks.    Chief Complaint   Patient presents with    Routine Prenatal Visit     No cramping , no contractions, vaginal discharge or bleeding, no leaking of fluid , + fetal movement        Subjective     Alexandra Huerta is a 28 y.o. female being seen today for her obstetrical visit. She is at 22w0d gestation. Patient reports no bleeding, no contractions, no cramping, no leaking, and denies HA, blurred vision, swelling, abd pain, dizziness. Normal urination or BM's. Heartburn with spicy foods.  . Fetal movement: normal.  Taking PNV and ASA daily.     Recently with URI, sxs improving overall.       Menstrual History:  OB History          3    Para   0    Term   0       0    AB   2    Living   0         SAB   2    IAB   0    Ectopic   0    Multiple   0    Live Births   0                Menarche age: N/A  Patient's last menstrual period was 2023 (exact date).       The following portions of the patient's history were reviewed and updated as appropriate: allergies, current medications, past family history, past medical history, past social history, past surgical history, and problem list.    Review of Systems  Pertinent items are noted in HPI.     Objective      /62 (BP Location: Right arm, Patient  "Position: Sitting, Cuff Size: Standard)   Ht 4' 11\" (1.499 m)   Wt 77.1 kg (170 lb)   LMP 08/11/2023 (Exact Date)   BMI 34.34 kg/m²   FHT: 149 BPM   Uterine Size: 22 cm and size equals dates            "

## 2024-02-16 LAB
CITATION REF LAB TEST: ABNORMAL
CLINICAL INFO: ABNORMAL
ETHNIC BACKGROUND STATED: ABNORMAL
GENE DIS ANL CARRIER INTERP-IMP: ABNORMAL
GENE MUT TESTED BLD/T: ABNORMAL
LAB DIRECTOR NAME PROVIDER: ABNORMAL
REASON FOR REFERRAL (NARRATIVE): ABNORMAL
RECOMMENDATION PATIENT DOC-IMP: ABNORMAL
REF LAB TEST METHOD: ABNORMAL
SERVICE CMNT-IMP: ABNORMAL
SMN1 GENE MUT ANL BLD/T: ABNORMAL
SPECIMEN SOURCE: ABNORMAL

## 2024-02-27 ENCOUNTER — TELEPHONE (OUTPATIENT)
Dept: OBGYN CLINIC | Facility: CLINIC | Age: 29
End: 2024-02-27

## 2024-02-27 NOTE — TELEPHONE ENCOUNTER
Called the patient to go over her 2nd trimester Ob Assessment , left her a voice message on her machine to return the call to Dr Coughlin office  at [phone]

## 2024-03-01 ENCOUNTER — OB ABSTRACT (OUTPATIENT)
Dept: OBGYN CLINIC | Facility: CLINIC | Age: 29
End: 2024-03-01

## 2024-03-01 LAB
CFTR FULL MUT ANL BLD/T SEQ: NORMAL
CITATION REF LAB TEST: NORMAL
CLINICAL INFO: NORMAL
ETHNIC BACKGROUND STATED: NORMAL
GENE DIS ANL CARRIER INTERP-IMP: NORMAL
INDICATION: NORMAL
LAB DIRECTOR NAME PROVIDER: NORMAL
RECOMMENDATION PATIENT DOC-IMP: NORMAL
REF LAB TEST METHOD: NORMAL
SERVICE CMNT-IMP: NORMAL
SPECIMEN SOURCE: NORMAL

## 2024-03-02 ENCOUNTER — CLINICAL SUPPORT (OUTPATIENT)
Age: 29
End: 2024-03-02

## 2024-03-02 DIAGNOSIS — Z32.2 ENCOUNTER FOR CHILDBIRTH INSTRUCTION: Primary | ICD-10-CM

## 2024-03-07 NOTE — TELEPHONE ENCOUNTER
Pt called back she said her phone does not work that  well  and  that  she has appointment next  week

## 2024-03-14 ENCOUNTER — TELEPHONE (OUTPATIENT)
Age: 29
End: 2024-03-14

## 2024-03-14 ENCOUNTER — ROUTINE PRENATAL (OUTPATIENT)
Dept: OBGYN CLINIC | Facility: CLINIC | Age: 29
End: 2024-03-14
Payer: COMMERCIAL

## 2024-03-14 VITALS
BODY MASS INDEX: 34.6 KG/M2 | SYSTOLIC BLOOD PRESSURE: 124 MMHG | HEIGHT: 59 IN | WEIGHT: 171.6 LBS | DIASTOLIC BLOOD PRESSURE: 66 MMHG

## 2024-03-14 DIAGNOSIS — Z36.9 ANTENATAL SCREENING ENCOUNTER: ICD-10-CM

## 2024-03-14 DIAGNOSIS — Z3A.26 26 WEEKS GESTATION OF PREGNANCY: Primary | ICD-10-CM

## 2024-03-14 PROCEDURE — 81002 URINALYSIS NONAUTO W/O SCOPE: CPT | Performed by: PHYSICIAN ASSISTANT

## 2024-03-14 PROCEDURE — PNV: Performed by: PHYSICIAN ASSISTANT

## 2024-03-14 NOTE — TELEPHONE ENCOUNTER
Patient called and stated she would like to go with the glucose monitor instead of doing the drink(just spoke to the provider today about both), sending to practice for notification. Thank you.

## 2024-03-14 NOTE — PROGRESS NOTES
124 assessment     Pregnancy 26 and 1/7 weeks     Plan     Routine OB visit end of second trimester doing well overall.  She has no new concerns or complaints today and feels well.    Patient counseled regarding negative cystic fibrosis screen, positive SMA carrier.  Discussed and offered further testing for FOB and answered questions pertaining to the testing.  Patient and partner undecided if they desire further testing but did briefly discuss the process of ordering SMA screening in FOB MyChart through [facility].  Patient and partner encouraged to consider, FOB would need to have active [facility] MyChart and can reach out to notify me to place order through his chart.  He expresses understanding.    Blood pressure today 124/66,  with fetal movement appreciated.  A positive blood type.  Urine dip trace protein otherwise unremarkable.    Patient reminded of M appointment follow-up missed anatomy on 3/29.    28-week labs discussed and ordered.  CBC and RPR ordered.  Discussed 1 hour GTT which patient notes that she gets severe migraine from dye.  I did contact laboratory to see if there are any additives and dyes in any of the drinks used for the test and the lemon lime does not have dye.  I did relay this message to patient however she still desires to check glucose at home.  I will have staff reach out to pharmacy to determine what glucometer brand is covered with insurance and I will send this to pharmacy.  Patient was instructed to check FBS and 2-hour postprandial sugars and keep a log x 2 weeks and bring to next visit.  Patient states she feels comfortable checking sugars at home but was encouraged to contact the office for nurse appointment so we can show her how to use it if any issues.    Stressed importance of staying well-hydrated, drinking plenty of water, exercise as tolerated.  Patient to continue ASA daily to DC at 36 weeks.  Patient encouraged to continue PNV daily.    Follow up in 2-3  "weeks.      Chief Complaint   Patient presents with    Routine Prenatal Visit     Pt feeling well, denies any VB or LOF, having good fetal movements         Subjective     Alexandra Huerta is a 28 y.o. female being seen today for her obstetrical visit. She is at 26w1d gestation.  She is doing well overall and reports no new concerns or complaints today.    Patient reports no bleeding, no contractions, no cramping, no leaking, and denies headache, blurry vision, swelling, abdominal pain, dizziness, nausea/vomiting.  Normal urination and bowel movements.  Occasional heartburn with spicy food. . Fetal movement: normal.    Taking PNV and aspirin daily.    Menstrual History:  OB History          3    Para   0    Term   0       0    AB   2    Living   0         SAB   2    IAB   0    Ectopic   0    Multiple   0    Live Births   0                Menarche age: N/A  Patient's last menstrual period was 2023 (exact date).       The following portions of the patient's history were reviewed and updated as appropriate: allergies, current medications, past family history, past medical history, past social history, past surgical history, and problem list.    Review of Systems  Pertinent items are noted in HPI.     Objective      /66 (BP Location: Left arm, Patient Position: Sitting, Cuff Size: Adult)   Ht 4' 11\" (1.499 m)   Wt 77.8 kg (171 lb 9.6 oz)   LMP 2023 (Exact Date)   BMI 34.66 kg/m²   FHT: 147 BPM   Uterine Size: 26 cm and size equals dates            "

## 2024-03-21 DIAGNOSIS — Z36.9 ANTENATAL SCREENING ENCOUNTER: ICD-10-CM

## 2024-03-21 DIAGNOSIS — Z3A.27 27 WEEKS GESTATION OF PREGNANCY: Primary | ICD-10-CM

## 2024-03-21 DIAGNOSIS — Z36.9 ANTENATAL SCREENING ENCOUNTER: Primary | ICD-10-CM

## 2024-03-21 NOTE — TELEPHONE ENCOUNTER
Staff please call patient and let her know that I received her message.  I entered the order for the 1 hr glucose tolerance test.  Please make sure patient communicates to the  that she needs lemon lime without any dye color.  Please also let patient know that I placed orders for blood work for syphilis screen and red and white blood cells/ Platelets that are typically part of the 28-week labs as well that she has not yet completed.  She should be able to get all 3 of these done together -she may need to mention that to the  that all 3 need to be collected since these were ordered on different dates.    Staff please also reach out to patient's pharmacy to cancel glucose testing supplies as I already sent in order for glucometer/lancets/test strips earlier today.  Thank you

## 2024-03-21 NOTE — TELEPHONE ENCOUNTER
Pt called back and said she changed her my  and will take the drink  for the Glucose test  and request a call back

## 2024-03-23 ENCOUNTER — CLINICAL SUPPORT (OUTPATIENT)
Age: 29
End: 2024-03-23

## 2024-03-23 DIAGNOSIS — Z32.2 ENCOUNTER FOR CHILDBIRTH INSTRUCTION: Primary | ICD-10-CM

## 2024-03-26 ENCOUNTER — ROUTINE PRENATAL (OUTPATIENT)
Dept: OBGYN CLINIC | Facility: CLINIC | Age: 29
End: 2024-03-26

## 2024-03-26 DIAGNOSIS — Z3A.27 27 WEEKS GESTATION OF PREGNANCY: Primary | ICD-10-CM

## 2024-03-26 PROCEDURE — PNV: Performed by: OBSTETRICS & GYNECOLOGY

## 2024-03-26 NOTE — PROGRESS NOTES
Patient presents at 27 weeks 6 days gestation for routine prenatal exam.  She denies decreased fetal movement, loss of fluid, vaginal bleeding, contractions or pain.  Her blood sugars have been in good control.  She will be seen by M on Friday.  She has had some intermittent episodes of vertigo and has an appointment with her family doctor for evaluation.  She also notices some increasing fatigue in the third trimester and she was reassured that this is normal.  Will see her back in 3 weeks or as needed.

## 2024-03-28 NOTE — PROGRESS NOTES
Please refer to the [facility] ultrasound report in Ob Procedures for additional information regarding today's visit

## 2024-03-29 ENCOUNTER — ULTRASOUND (OUTPATIENT)
Facility: HOSPITAL | Age: 29
End: 2024-03-29
Payer: COMMERCIAL

## 2024-03-29 ENCOUNTER — APPOINTMENT (OUTPATIENT)
Dept: LAB | Facility: CLINIC | Age: 29
End: 2024-03-29
Payer: COMMERCIAL

## 2024-03-29 VITALS
SYSTOLIC BLOOD PRESSURE: 122 MMHG | BODY MASS INDEX: 35.35 KG/M2 | OXYGEN SATURATION: 99 % | WEIGHT: 175.04 LBS | DIASTOLIC BLOOD PRESSURE: 60 MMHG | HEART RATE: 98 BPM

## 2024-03-29 DIAGNOSIS — Z36.9 ANTENATAL SCREENING ENCOUNTER: ICD-10-CM

## 2024-03-29 DIAGNOSIS — Z3A.28 28 WEEKS GESTATION OF PREGNANCY: Primary | ICD-10-CM

## 2024-03-29 DIAGNOSIS — O99.213 MATERNAL OBESITY, ANTEPARTUM, THIRD TRIMESTER: ICD-10-CM

## 2024-03-29 LAB
BASOPHILS # BLD MANUAL: 0 THOUSAND/UL (ref 0–0.1)
BASOPHILS NFR MAR MANUAL: 0 % (ref 0–1)
EOSINOPHIL # BLD MANUAL: 0.46 THOUSAND/UL (ref 0–0.4)
EOSINOPHIL NFR BLD MANUAL: 4 % (ref 0–6)
ERYTHROCYTE [DISTWIDTH] IN BLOOD BY AUTOMATED COUNT: 13.7 % (ref 11.6–15.1)
GLUCOSE 1H P 50 G GLC PO SERPL-MCNC: 192 MG/DL (ref 70–134)
HCT VFR BLD AUTO: 36.2 % (ref 34.8–46.1)
HGB BLD-MCNC: 12.2 G/DL (ref 11.5–15.4)
LYMPHOCYTES # BLD AUTO: 1.48 THOUSAND/UL (ref 0.6–4.47)
LYMPHOCYTES # BLD AUTO: 13 % (ref 14–44)
MCH RBC QN AUTO: 31.3 PG (ref 26.8–34.3)
MCHC RBC AUTO-ENTMCNC: 33.7 G/DL (ref 31.4–37.4)
MCV RBC AUTO: 93 FL (ref 82–98)
MONOCYTES # BLD AUTO: 0.8 THOUSAND/UL (ref 0–1.22)
MONOCYTES NFR BLD: 7 % (ref 4–12)
NEUTROPHILS # BLD MANUAL: 8.68 THOUSAND/UL (ref 1.85–7.62)
NEUTS SEG NFR BLD AUTO: 76 % (ref 43–75)
PLATELET # BLD AUTO: 255 THOUSANDS/UL (ref 149–390)
PLATELET BLD QL SMEAR: ADEQUATE
PMV BLD AUTO: 11.7 FL (ref 8.9–12.7)
RBC # BLD AUTO: 3.9 MILLION/UL (ref 3.81–5.12)
RBC MORPH BLD: NORMAL
TREPONEMA PALLIDUM IGG+IGM AB [PRESENCE] IN SERUM OR PLASMA BY IMMUNOASSAY: NORMAL
WBC # BLD AUTO: 11.42 THOUSAND/UL (ref 4.31–10.16)

## 2024-03-29 PROCEDURE — 85027 COMPLETE CBC AUTOMATED: CPT

## 2024-03-29 PROCEDURE — 76816 OB US FOLLOW-UP PER FETUS: CPT | Performed by: OBSTETRICS & GYNECOLOGY

## 2024-03-29 PROCEDURE — 85007 BL SMEAR W/DIFF WBC COUNT: CPT

## 2024-03-29 PROCEDURE — 36415 COLL VENOUS BLD VENIPUNCTURE: CPT

## 2024-03-29 PROCEDURE — 86780 TREPONEMA PALLIDUM: CPT

## 2024-03-29 PROCEDURE — 82950 GLUCOSE TEST: CPT

## 2024-03-29 NOTE — LETTER
March 29, 2024     Enedina Coughlin MD  [address] [facility]  1st [address]  [place] [id number]    Patient: Alexandra Huerta   YOB: 1995   Date of Visit: 3/29/2024       Dear Dr. Coughlin:    Thank you for referring Alexandra Huerta to me for evaluation. Below are my notes for this consultation.    If you have questions, please do not hesitate to call me. I look forward to following your patient along with you.         Sincerely,        Good Harris MD        CC: No Recipients    Good Harris MD  3/29/2024  2:25 PM  Sign when Signing Visit  Please refer to the [facility] ultrasound report in Ob Procedures for additional information regarding today's visit

## 2024-04-02 DIAGNOSIS — R73.09 ABNORMAL GTT (GLUCOSE TOLERANCE TEST): ICD-10-CM

## 2024-04-02 DIAGNOSIS — O24.419 GESTATIONAL DIABETES MELLITUS (GDM) IN THIRD TRIMESTER, GESTATIONAL DIABETES METHOD OF CONTROL UNSPECIFIED: Primary | ICD-10-CM

## 2024-04-09 ENCOUNTER — TELEPHONE (OUTPATIENT)
Age: 29
End: 2024-04-09

## 2024-04-11 ENCOUNTER — TELEMEDICINE (OUTPATIENT)
Dept: PERINATAL CARE | Facility: CLINIC | Age: 29
End: 2024-04-11
Payer: COMMERCIAL

## 2024-04-11 ENCOUNTER — PATIENT MESSAGE (OUTPATIENT)
Dept: PERINATAL CARE | Facility: CLINIC | Age: 29
End: 2024-04-11

## 2024-04-11 DIAGNOSIS — O24.410 DIET CONTROLLED GESTATIONAL DIABETES MELLITUS (GDM) IN THIRD TRIMESTER: Primary | ICD-10-CM

## 2024-04-11 DIAGNOSIS — Z3A.30 30 WEEKS GESTATION OF PREGNANCY: ICD-10-CM

## 2024-04-11 DIAGNOSIS — O99.213 OTHER OBESITY DUE TO EXCESS CALORIES AFFECTING PREGNANCY IN THIRD TRIMESTER: ICD-10-CM

## 2024-04-11 DIAGNOSIS — E66.09 OTHER OBESITY DUE TO EXCESS CALORIES AFFECTING PREGNANCY IN THIRD TRIMESTER: ICD-10-CM

## 2024-04-11 PROCEDURE — G0109 DIAB MANAGE TRN IND/GROUP: HCPCS | Performed by: DIETITIAN, REGISTERED

## 2024-04-11 RX ORDER — BLOOD-GLUCOSE METER
EACH MISCELLANEOUS
Qty: 1 KIT | Refills: 0 | Status: SHIPPED | OUTPATIENT
Start: 2024-04-11 | End: 2024-06-19

## 2024-04-11 RX ORDER — BLOOD SUGAR DIAGNOSTIC
STRIP MISCELLANEOUS
Qty: 100 STRIP | Refills: 4 | Status: SHIPPED | OUTPATIENT
Start: 2024-04-11 | End: 2024-06-19

## 2024-04-11 RX ORDER — LANCETS 33 GAUGE
EACH MISCELLANEOUS
Qty: 100 EACH | Refills: 4 | Status: SHIPPED | OUTPATIENT
Start: 2024-04-11 | End: 2024-06-19

## 2024-04-11 NOTE — PROGRESS NOTES
Virtual Regular Visit    Verification of patient location: [place]    Patient is located at Home in the following state in which I hold an active license [place]      Assessment/Plan:    Problem List Items Addressed This Visit    None           Reason for visit is   Chief Complaint   Patient presents with    Virtual Regular Visit          Encounter provider Ana Shultz    Provider located at [address] [id number] [phone]      Recent Visits  No visits were found meeting these conditions.  Showing recent visits within past 7 days and meeting all other requirements  Today's Visits  Date Type Provider Dept   24 Telemedicine Ana Shultz [facility]   Showing today's visits and meeting all other requirements  Future Appointments  No visits were found meeting these conditions.  Showing future appointments within next 150 days and meeting all other requirements       The patient was identified by name and date of birth. Alexandra Huerta was informed that this is a telemedicine visit and that the visit is being conducted through the [employer] platform. She agrees to proceed..  My office door was closed. No one else was in the room.  She acknowledged consent and understanding of privacy and security of the video platform. The patient has agreed to participate and understands they can discontinue the visit at any time.    Patient is aware this is a billable service.     Subjective  Alexandra Huerta is a 29 y.o. female pregnant patient .      HPI     Past Medical History:   Diagnosis Date    Anxiety     Asthma     Female infertility First miscarriage, 2020. Second miscarriage 2022, possible 3rd recently    Miscarriage 2020    I miscarried again, 2022, shortly after taking a pregnancy test that displayed a faint line    Reactive airway disease        Past Surgical History:   Procedure Laterality  "Date    TONGUE BIOPSY      WISDOM TOOTH EXTRACTION         Current Outpatient Medications   Medication Sig Dispense Refill    aspirin 81 mg chewable tablet Chew 2 tablets (162 mg total) daily 180 tablet 2    Prenatal Multivit-Min-Fe-FA (PRE- PO) Take by mouth       No current facility-administered medications for this visit.        No Known Allergies    Review of Systems    Video Exam    There were no vitals filed for this visit.    Physical Exam --not performed    Visit Time  Total Visit Duration: 60 minutes        Thank you for referring your patient to [facility] Maternal [facility]etal [facility] Diabetes in Pregnancy Program.     Alexandra Huerta is a  29 y.o. female who presents today for Group  Class 1.  Patient is at 30w1d gestation, Estimated Date of Delivery: 24.     Reviewed and updated the following from patients medical record: PMH, Problem List, Allergies, and Current Medications.    Visit Diagnosis:  Diet controlled GDM    Discussed with patient pathophysiology of GDM, untreated hyperglycemia in pregnancy and maternal fetal complications including fetal macrosomia,  hypoglycemia, polyhydramnios, increased incidence of  section,  labor, and in severe cases fetal demise and still birth . Discussed importance of blood glucose monitoring, nutrition, and medication if necessary in achieving BG goals.     Additional Pregnancy Complications:  Obesity    Labs:    Lab Results   Component Value Date    [id number] 192 (H) 2024       Lab Results   Component Value Date    GLUF 75 2024        No components found for: \"HGA1C\"    Medications:  No diabetes related medications    Anthropometrics:  Ht Readings from Last 3 Encounters:   24 4' 11\" (1.499 m)   24 4' 11\" (1.499 m)   24 4' 11\" (1.499 m)     Wt Readings from Last 3 Encounters:   24 79.4 kg (175 lb 0.7 oz)   24 77.8 kg (171 lb 9.6 oz)   24 77.1 kg (170 lb)     Pre-gravid weight: 75.3 kg (166 " lb)  Pre-gravid BMI: 33.51  Weight Change: 4.103 kg (9 lb 0.7 oz)  Weight gain recommendations: BMI (> 30) 11-20 lbs  Comments: may gain 10 more pounds for the remainder of the pregnancy    Recent Ultra Sound Results:  Date:  3/29/24  Fetal Growth: Normal  SANDEEP: Normal  Next  date: 24    Blood Glucose Monitoring:   Glucose Meter: OneTouch Verio Flex  Instructed on testing blood sugars: 4 x per day (Fasting, 2 hour after start of each meal)    Gave instruction on site selection, skin preparation, loading strips and lancet device, meter activation, obtaining blood sample, test strip and lancet disposal and storage, and recording log book entries. Patient has good understanding of material covered and was able to test their own blood sugar in office today.     Instruction for reporting blood sugar results weekly via:  Phone: [phone]   OR  My Chart (Message with image attachment, or Glucose Flowsheet)    Goal Blood Sugar Ranges:   Fastin-90 mg/dL  1 hour after the start of each meal: 140 mg/dL or less  2 hours after start of each meal: 120 mg/dL or less    Meal Plan (daily calorie and protein needs):  Calories: 1800 calorie (CHO: 45-15-45-15-45-30) (PRO:2-1-3-1-3-2)    Type of Diet:Regular  Additional Nutrition Concerns: none    Meal Plan Tips:  1. Patient was provided with a meal plan including 3 meals and 3 snacks.  2. Discussed appropriate amounts of CHO, PRO, and Fat at each meal and snack.   3. Reviewed CHO exchange list, and portion sizes for both CHO and PRO via food models  4. Instruction on how to read a food label  5. Provided suggested meal/snack options to increase nutrition and maintain consistent meal and snack intakes.  6. Instructed on how to keep a 3-day food diary to be brought to follow- up appointment.   7. Encouraged  patient to eat every 2.0-3.5 hours while awake  8. Encouraged patient to go no longer than 8-10 hours fasting overnight until first meal of the day.      Physical  Activity:  Discussed benefits of physical activity to optimize blood glucose control, encouraged activity at patient is physically able. Always consult a physician prior to starting an exercise program. Recommend 20-30 minutes daily.    Patient Stated Goal:  Pateint will avoid muffins & desserts for the remainder of her pregnancy.     Diabetes Self Management Support Plan outside of ongoing care: Spouse/Family    Learner/s Present:Learners Present: Patient   Barriers to Learning/Change: No Barriers  Expected Compliance: good    Date to report blood sugars: Wednesday, 4/17/24  Class 2 (date): Thursday, 4/18/24    Begin Time: 1:05 PM  End Time: 2:10 PM    It was a pleasure working with them today. Please feel free to call with any questions or concerns.    Ana Shultz, MS, RD, [facility]  Diabetes Educator  [facility] Maternal Fetal Medicine  Diabetes in Pregnancy Program  [address] [place] [address], [address]  [place] [address]

## 2024-04-17 ENCOUNTER — ROUTINE PRENATAL (OUTPATIENT)
Dept: OBGYN CLINIC | Facility: CLINIC | Age: 29
End: 2024-04-17
Payer: COMMERCIAL

## 2024-04-17 ENCOUNTER — TELEPHONE (OUTPATIENT)
Age: 29
End: 2024-04-17

## 2024-04-17 VITALS
SYSTOLIC BLOOD PRESSURE: 112 MMHG | DIASTOLIC BLOOD PRESSURE: 74 MMHG | HEIGHT: 59 IN | WEIGHT: 175 LBS | BODY MASS INDEX: 35.28 KG/M2

## 2024-04-17 DIAGNOSIS — O24.410 DIET CONTROLLED GESTATIONAL DIABETES MELLITUS (GDM) IN THIRD TRIMESTER: Primary | ICD-10-CM

## 2024-04-17 DIAGNOSIS — Z3A.31 31 WEEKS GESTATION OF PREGNANCY: Primary | ICD-10-CM

## 2024-04-17 DIAGNOSIS — Z3A.31 31 WEEKS GESTATION OF PREGNANCY: ICD-10-CM

## 2024-04-17 DIAGNOSIS — N89.8 VAGINAL DISCHARGE: ICD-10-CM

## 2024-04-17 PROBLEM — Z3A.22 22 WEEKS GESTATION OF PREGNANCY: Status: RESOLVED | Noted: 2023-12-15 | Resolved: 2024-04-17

## 2024-04-17 LAB
SL AMB  POCT GLUCOSE, UA: NORMAL
SL AMB LEUKOCYTE ESTERASE,UA: NORMAL
SL AMB POCT NITRITE,UA: NORMAL
SL AMB POCT URINE PROTEIN: NORMAL

## 2024-04-17 PROCEDURE — 87510 GARDNER VAG DNA DIR PROBE: CPT | Performed by: PHYSICIAN ASSISTANT

## 2024-04-17 PROCEDURE — 87480 CANDIDA DNA DIR PROBE: CPT | Performed by: PHYSICIAN ASSISTANT

## 2024-04-17 PROCEDURE — 87660 TRICHOMONAS VAGIN DIR PROBE: CPT | Performed by: PHYSICIAN ASSISTANT

## 2024-04-17 PROCEDURE — PNV: Performed by: PHYSICIAN ASSISTANT

## 2024-04-17 PROCEDURE — 81002 URINALYSIS NONAUTO W/O SCOPE: CPT | Performed by: PHYSICIAN ASSISTANT

## 2024-04-17 RX ORDER — BLOOD-GLUCOSE METER
EACH MISCELLANEOUS
Qty: 1 KIT | Refills: 0 | Status: SHIPPED | OUTPATIENT
Start: 2024-04-17 | End: 2024-06-25

## 2024-04-17 RX ORDER — BLOOD SUGAR DIAGNOSTIC
STRIP MISCELLANEOUS
Qty: 100 STRIP | Refills: 4 | Status: SHIPPED | OUTPATIENT
Start: 2024-04-17 | End: 2024-06-25

## 2024-04-17 RX ORDER — LANCETS 33 GAUGE
EACH MISCELLANEOUS
Qty: 100 EACH | Refills: 4 | Status: SHIPPED | OUTPATIENT
Start: 2024-04-17 | End: 2024-06-25

## 2024-04-17 NOTE — PROGRESS NOTES
Assessment     Pregnancy 31 and 0/7 weeks     Plan    Routine OB visit third trimester doing well overall.  She does note a few episodes throughout her pregnancy of a light tan discharge, most recently past 2 days or so.  She notes an increase in discharge as well without odor or itching.  She denies any vaginal or pelvic pain, no cramping or contraction, no vaginal bleeding and good fetal movement.  Examination today does reveal increased discharge however she reports she had partner recent sexual activity last night with ejaculation inside.  No other significant abnormalities.  No vaginal bleeding evident on speculum exam, cervical os is closed with no brown or bloody discharge which is reassuring.  Patient will keep an eye on symptoms for now.  Vaginosis probe collected rule out infection and will call with results.    Recent diagnosis GDM, not yet able to  diabetic testing supplies to start checking her sugars.  She was encouraged to do so as soon as she could.  She will continue follow-up with [facility] diabetic management and follow diet and exercise recommendation.    Patient is positive SMA carrier.  At a prior OB visit I discussed options for testing with FOB.  He did not have a [employer] account set up for me to order the test and was given information of how to set up account.  He states he has still not yet done so.  Again reviewed significance of positive carrier state.  Unsure if they would like to do further testing at this time.    Patient's blood pressure 112/74,  with good fetal movement  Urine dip trace protein  A+ blood type    Patient reminded [facility] follow-up ultrasound schedule 4/26  She will continue PNV daily  ASA daily, discontinue 36 weeks  Again stressed importance of staying well-hydrated drinking plenty of water, well-balanced diet, exercise as tolerated.  Stressed kick counts    It does not appear Tdap was offered at 28-week visit, unsure if yellow folder yet provided.  Will   patient with this next visit.     Follow up in 2 Weeks.      Chief Complaint   Patient presents with    Routine Prenatal Visit     Pt is here for routine prenatal visit. Pt denies headaches, dizziness, swelling of hands and feet, VB and LOF. Pt states she has been noticing some brown spotting on her underwear but when she wipes it is clear. Pt states she has been having more discharge than normal. Pt denies vaginal itching/ odor. Pt states she is having good fetal movement.        Subjective     Alexandra Huerta is a 29 y.o. female being seen today for her obstetrical visit. She is at 31w0d gestation. She notes was very active outside over weekend and noticed some light tan discharge in her underwear.  States that she has had light tan discharge once or twice before during her pregnancy. denies vaginal itching.  Denies odor or vaginal bleeding. No pelvic cramping or pain out of ordinary.  Recently sexually active, denies any significant postcoital bleeding.  Normal urination. Normal BM's. Patient reports no contractions, no cramping, no leaking, and denies headache, blurry vision, dizziness, abdominal pain, nausea/vomiting, significant reflux or swelling . Fetal movement: normal.    Recently diagnosed with GDM.  Patient states that she has not yet been able to  prescription for her testing supplies, states pharmacy did not have them in stock and needed to be ordered.  Received a call yesterday they were available for pickup.  She is established with [facility] diabetes management.    Menstrual History:  OB History          3    Para   0    Term   0       0    AB   2    Living   0         SAB   2    IAB   0    Ectopic   0    Multiple   0    Live Births   0                Menarche age: N/A  Patient's last menstrual period was 2023 (exact date).       The following portions of the patient's history were reviewed and updated as appropriate: allergies, current medications, past family history, past  "medical history, past social history, past surgical history, and problem list.    Review of Systems  Pertinent items are noted in HPI.     Objective     /74 (BP Location: Left arm, Patient Position: Sitting, Cuff Size: Adult)   Ht 4' 11\" (1.499 m)   Wt 79.4 kg (175 lb)   LMP 08/11/2023 (Exact Date)   BMI 35.35 kg/m²   FHT:  147 BPM   Uterine Size: 31 cm and size equals dates   Presentation: unsure    Gentle speculum exam reveals normal vulva, normal appearing vagina without blood, visible erythema or lesion.  Moderate amount of smooth thicker white/light yellow discharge, patient notes sexual intercourse last night with ejaculation inside.  No brown or tan discharge noted.  Cervix viewed with speculum, cervical os appears to be closed without any bleeding or brown discharge noted.          "

## 2024-04-17 NOTE — TELEPHONE ENCOUNTER
Patient called she was told by [facility] that they do not have the glucose monitor kit in stock. Patient had asked if the glucose monitor and supplies can be resent to [facility] at [address] Patient can be reached at [phone].

## 2024-04-18 ENCOUNTER — TELEPHONE (OUTPATIENT)
Dept: PERINATAL CARE | Facility: CLINIC | Age: 29
End: 2024-04-18

## 2024-04-18 DIAGNOSIS — B96.89 BV (BACTERIAL VAGINOSIS): Primary | ICD-10-CM

## 2024-04-18 DIAGNOSIS — N76.0 BV (BACTERIAL VAGINOSIS): Primary | ICD-10-CM

## 2024-04-18 LAB
CANDIDA RRNA VAG QL PROBE: NOT DETECTED
G VAGINALIS RRNA GENITAL QL PROBE: DETECTED
T VAGINALIS RRNA GENITAL QL PROBE: NOT DETECTED

## 2024-04-18 RX ORDER — METRONIDAZOLE 500 MG/1
500 TABLET ORAL EVERY 12 HOURS SCHEDULED
Qty: 14 TABLET | Refills: 0 | Status: SHIPPED | OUTPATIENT
Start: 2024-04-18 | End: 2024-04-25

## 2024-04-23 ENCOUNTER — TELEPHONE (OUTPATIENT)
Age: 29
End: 2024-04-23

## 2024-04-23 NOTE — TELEPHONE ENCOUNTER
Spoke with patient she said she attended the 1st GDM class and went to the pharmacy to  her meter and it was on back order they only had the lancets. She was able to get the meter at a different pharmacy and will pick it up tomorrow so she will start monitoring her sugar tomorrow. She said she does not plan on coming back for the 2nd GDM class. Said she feels like she has the jest of the information. She will monitor her sugar and report it weekly. She has an appointment this Thu with her OB/GYN and will discuss with them.

## 2024-04-24 ENCOUNTER — PATIENT MESSAGE (OUTPATIENT)
Dept: PERINATAL CARE | Facility: CLINIC | Age: 29
End: 2024-04-24

## 2024-04-25 ENCOUNTER — ROUTINE PRENATAL (OUTPATIENT)
Dept: OBGYN CLINIC | Facility: CLINIC | Age: 29
End: 2024-04-25

## 2024-04-25 VITALS
SYSTOLIC BLOOD PRESSURE: 112 MMHG | DIASTOLIC BLOOD PRESSURE: 60 MMHG | BODY MASS INDEX: 35.48 KG/M2 | HEIGHT: 59 IN | WEIGHT: 176 LBS

## 2024-04-25 DIAGNOSIS — Z3A.32 32 WEEKS GESTATION OF PREGNANCY: Primary | ICD-10-CM

## 2024-04-25 DIAGNOSIS — O24.410 DIET CONTROLLED GESTATIONAL DIABETES MELLITUS (GDM) IN THIRD TRIMESTER: ICD-10-CM

## 2024-04-25 PROBLEM — Z36.9 ANTENATAL SCREENING ENCOUNTER: Status: RESOLVED | Noted: 2024-03-21 | Resolved: 2024-04-25

## 2024-04-25 PROCEDURE — PNV: Performed by: PHYSICIAN ASSISTANT

## 2024-04-25 NOTE — PROGRESS NOTES
Assessment     Pregnancy 32 and 1/7 weeks     Plan     Routine OB visit doing well overall with no new concerns or complaints today.  Recent diagnosis of GDM with elevated 1 hour .  She has since established with [facility] diabetes management has been checking her sugars at home which have been at goal.  She is also making appropriate dietary change.  She will continue following with [facility] diabetes for further management.    Blood pressure today 112/60,  with good fetal movement appreciated.  A+ blood type.  Urine sample not obtained as patient unable to urinate today.    Patient reminded she has [facility] follow-up ultrasound scheduled for tomorrow.  Patient will continue ASA, PNV daily.    Patient complained of some discharge last visit with vaginal culture obtained positive for BV.  Oral metronidazole pills prescribed the patient has not yet started them.  Patient reminded to pick them up from pharmacy and complete course.  Stressed importance of staying well-hydrated drinking plenty water, well-balanced diet, exercise as tolerated.    Patient has already been provided yellow folder  Stressed kick counts  Patient counseled again regarding Tdap vaccine which patient will consider, can offer at next OB visit.    Follow up in 2 Weeks.      Chief Complaint   Patient presents with    Routine Prenatal Visit     Pt is here for routine prenatal visit. Pt denies headaches, dizziness, swelling of hands and feet, VB and LOF. Pt states she is having good fetal movement.        Subjective     Alexandra Huerta is a 29 y.o. female being seen today for her obstetrical visit. She is at 32w1d gestation.     Patient reports no bleeding, no contractions, no cramping, no leaking, and denies HA, blurred vision or dizziness, denies abd pain, N/V, swelling.  She is experiencing increase in acid reflux - TUMS helps on occasion.     Fetal movement: normal.    She has GDM with 1 hr gtt 192.  She is connected with [facility] diabetes management  FBS  "this AM 77; 2 hour .  She has been changing her diet.    Patient's partner and patient decided he will not have SMA carrier testing.    Menstrual History:  OB History          3    Para   0    Term   0       0    AB   2    Living   0         SAB   2    IAB   0    Ectopic   0    Multiple   0    Live Births   0                Menarche age: N/A  Patient's last menstrual period was 2023 (exact date).       The following portions of the patient's history were reviewed and updated as appropriate: allergies, current medications, past family history, past medical history, past social history, past surgical history, and problem list.    Review of Systems  Pertinent items are noted in HPI.     Objective     /60 (BP Location: Left arm, Patient Position: Sitting, Cuff Size: Adult)   Ht 4' 11\" (1.499 m)   Wt 79.8 kg (176 lb)   LMP 2023 (Exact Date)   BMI 35.55 kg/m²   FHT: 154 BPM   Uterine Size: 32 cm and size equals dates   Presentation: unsure              "

## 2024-04-26 ENCOUNTER — ULTRASOUND (OUTPATIENT)
Facility: HOSPITAL | Age: 29
End: 2024-04-26
Payer: COMMERCIAL

## 2024-04-26 VITALS
HEART RATE: 90 BPM | SYSTOLIC BLOOD PRESSURE: 110 MMHG | DIASTOLIC BLOOD PRESSURE: 58 MMHG | WEIGHT: 175 LBS | BODY MASS INDEX: 35.28 KG/M2 | HEIGHT: 59 IN

## 2024-04-26 DIAGNOSIS — Z36.89 ENCOUNTER FOR ULTRASOUND TO ASSESS FETAL GROWTH: ICD-10-CM

## 2024-04-26 DIAGNOSIS — Z3A.32 32 WEEKS GESTATION OF PREGNANCY: Primary | ICD-10-CM

## 2024-04-26 DIAGNOSIS — O24.410 DIET CONTROLLED GESTATIONAL DIABETES MELLITUS (GDM) IN THIRD TRIMESTER: ICD-10-CM

## 2024-04-26 DIAGNOSIS — Z36.2 ENCOUNTER FOR FOLLOW-UP ULTRASOUND OF FETAL ANATOMY: ICD-10-CM

## 2024-04-26 PROCEDURE — 99213 OFFICE O/P EST LOW 20 MIN: CPT | Performed by: OBSTETRICS & GYNECOLOGY

## 2024-04-26 PROCEDURE — 76816 OB US FOLLOW-UP PER FETUS: CPT | Performed by: OBSTETRICS & GYNECOLOGY

## 2024-04-26 NOTE — PROGRESS NOTES
"[facility]: Alexandra Huerta was seen today at 32w2d for fetal growth and followup missed anatomy ultrasound.  See ultrasound report under \"OB Procedures\" tab.  Please don't hesitate to contact our office with any concerns or questions.  -Dimple Paulino MD    "

## 2024-04-26 NOTE — LETTER
"2024     Enedina Coughlin MD  [address]  [place] [address]    Patient: Alexandra Huerta   YOB: 1995   Date of Visit: 2024       Dear Dr. Coughlin:    Thank you for referring Alexandra Huerta to me for evaluation. Below are my notes for this consultation.    If you have questions, please do not hesitate to call me. I look forward to following your patient along with you.         Sincerely,        Dimple Paulino MD        CC: No Recipients    Dimple Paulino MD  2024 11:13 AM  Sign when Signing Visit  [facility]: Alexandra Huerta was seen today at 32w2d for fetal growth and followup missed anatomy ultrasound.  See ultrasound report under \"OB Procedures\" tab.  Please don't hesitate to contact our office with any concerns or questions.  -Dimple Paulino MD    "

## 2024-05-02 ENCOUNTER — OB ABSTRACT (OUTPATIENT)
Dept: OBGYN CLINIC | Facility: CLINIC | Age: 29
End: 2024-05-02

## 2024-05-07 ENCOUNTER — ROUTINE PRENATAL (OUTPATIENT)
Dept: OBGYN CLINIC | Facility: CLINIC | Age: 29
End: 2024-05-07

## 2024-05-07 VITALS
WEIGHT: 176 LBS | BODY MASS INDEX: 35.48 KG/M2 | DIASTOLIC BLOOD PRESSURE: 60 MMHG | SYSTOLIC BLOOD PRESSURE: 112 MMHG | HEIGHT: 59 IN

## 2024-05-07 DIAGNOSIS — Z28.39 RUBELLA NON-IMMUNE STATUS, ANTEPARTUM: Primary | ICD-10-CM

## 2024-05-07 DIAGNOSIS — O09.899 RUBELLA NON-IMMUNE STATUS, ANTEPARTUM: Primary | ICD-10-CM

## 2024-05-07 PROCEDURE — PNV: Performed by: OBSTETRICS & GYNECOLOGY

## 2024-05-07 NOTE — PROGRESS NOTES
Patient presents at 3 3 weeks 6 days gestation for routine prenatal exam.  She denies decreased fetal movement, loss of fluid, contractions or pelvic pain.  She has noticed some increasing numbness in her hands particularly after sleeping.  Is to consider wrist bracing if the numbness became worse.  See her back in 2 weeks or as needed.

## 2024-05-09 ENCOUNTER — DOCUMENTATION (OUTPATIENT)
Dept: PERINATAL CARE | Facility: CLINIC | Age: 29
End: 2024-05-09

## 2024-05-09 NOTE — PROGRESS NOTES
Date: 05/09/24  Alexandra Huerta  1995  Estimated Date of Delivery: 6/19/24  34w1d  OB/GYN:[place]    Diagnosis:  Diet controlled GDM    Blood Sugar Logs Submitted via: Glucose Flowsheet      Assessment and Plan:  No diabetes related medications. Has declined to Attend Class 2.  1800 calorie (CHO: 45-15-45-15-45-30) (PRO:2-1-3-1-3-2) meal plan consisting of 3 meals and 3 snacks daily including protein at each  Advised patient to continue current meal plan  Avoid fasting for > 10 hours overnight  Continue SMBG 4 x per day (Fasting, 2 hour after start of each meal) with a One-Touch Verio glucose meter  If okay by physician, recommend up to 30 minutes of physical activity daily     Lab Work:   Lab Results   Component Value Date    HGBA1C 5.3 02/06/2024      Ultrasound:       Date:4/25/24       Fetal Growth: Normal       SANDEEP: Normal  Next 6/7/24    Diabetes Self Management Support Plan outside of ongoing care: Spouse/Family   Patient Stated Goal:  Pateint will avoid muffins & desserts for the remainder of the pregnancy    Goal Assessment:  unable to determine as declined Class 2    Date to report next: weekly     Ana Shultz, MS, RD, CDECS  Diabetes Educator  [facility] Maternal Fetal Medicine  Diabetes in Pregnancy Program  [address], [address]  [place] [address]

## 2024-05-16 ENCOUNTER — DOCUMENTATION (OUTPATIENT)
Dept: PERINATAL CARE | Facility: CLINIC | Age: 29
End: 2024-05-16

## 2024-05-16 NOTE — PROGRESS NOTES
Date: 05/16/24  Alexandra Huerta  1995  Estimated Date of Delivery: 6/19/24  35w1d  [place]N:[place]    Diagnosis:  Diet controlled GDM    Blood Sugar Logs Submitted via: Glucose Flowsheet      Assessment and Plan:  No diabetes related medications. Has declined to Attend Class 2. Many missing BG results.   1800 calorie (CHO: 45-15-45-15-45-30) (PRO:2-1-3-1-3-2) meal plan consisting of 3 meals and 3 snacks daily including protein at each  Advised patient to continue current meal plan  Avoid fasting for > 10 hours overnight  Continue SMBG 4 x per day (Fasting, 2 hour after start of each meal) with a One-Touch Verio glucose meter  If okay by physician, recommend up to 30 minutes of physical activity daily     Lab Work:   Lab Results   Component Value Date    HGBA1C 5.3 02/06/2024      Ultrasound:       Date:4/25/24       Fetal Growth: Normal       SANDEEP: Normal  Next 6/7/24    Diabetes Self Management Support Plan outside of ongoing care: Spouse/Family   Patient Stated Goal:  Pateint will avoid muffins & desserts for the remainder of the pregnancy    Goal Assessment:  unable to determine as declined Class 2    Date to report next: weekly     Ana Shultz, MS, RD, CDECS  Diabetes Educator  [facility] Maternal Fetal Medicine  Diabetes in Pregnancy Program  [address], [address]  [place] [address]

## 2024-05-23 ENCOUNTER — ROUTINE PRENATAL (OUTPATIENT)
Dept: OBGYN CLINIC | Facility: CLINIC | Age: 29
End: 2024-05-23
Payer: COMMERCIAL

## 2024-05-23 ENCOUNTER — DOCUMENTATION (OUTPATIENT)
Dept: PERINATAL CARE | Facility: CLINIC | Age: 29
End: 2024-05-23

## 2024-05-23 VITALS
HEIGHT: 59 IN | SYSTOLIC BLOOD PRESSURE: 112 MMHG | WEIGHT: 178 LBS | DIASTOLIC BLOOD PRESSURE: 62 MMHG | BODY MASS INDEX: 35.88 KG/M2

## 2024-05-23 DIAGNOSIS — O24.410 DIET CONTROLLED GESTATIONAL DIABETES MELLITUS (GDM) IN THIRD TRIMESTER: ICD-10-CM

## 2024-05-23 DIAGNOSIS — Z3A.36 36 WEEKS GESTATION OF PREGNANCY: Primary | ICD-10-CM

## 2024-05-23 PROCEDURE — 81002 URINALYSIS NONAUTO W/O SCOPE: CPT | Performed by: PHYSICIAN ASSISTANT

## 2024-05-23 PROCEDURE — PNV: Performed by: PHYSICIAN ASSISTANT

## 2024-05-23 PROCEDURE — 87150 DNA/RNA AMPLIFIED PROBE: CPT | Performed by: PHYSICIAN ASSISTANT

## 2024-05-23 NOTE — PROGRESS NOTES
Date: 05/23/24  Alexandra Huerta  1995  Estimated Date of Delivery: 6/19/24  36w1d  OB/GYN:[place]    Diagnosis:  Diet controlled GDM    Blood Sugar Logs Submitted via: Glucose Flowsheet      Assessment and Plan:  No diabetes related medications. Has declined to Attend Class 2. Many missing BG results.   1800 calorie (CHO: 45-15-45-15-45-30) (PRO:2-1-3-1-3-2) meal plan consisting of 3 meals and 3 snacks daily including protein at each  Advised patient to continue current meal plan  Avoid fasting for > 10 hours overnight  Continue SMBG 4 x per day (Fasting, 2 hour after start of each meal) with a One-Touch Verio glucose meter  If okay by physician, recommend up to 30 minutes of physical activity daily     Lab Work:   Lab Results   Component Value Date    HGBA1C 5.3 02/06/2024      Ultrasound:       Date:4/25/24       Fetal Growth: Normal       SANDEEP: Normal  Next 6/7/24    Diabetes Self Management Support Plan outside of ongoing care: Spouse/Family   Patient Stated Goal:  Pateint will avoid muffins & desserts for the remainder of the pregnancy    Goal Assessment:  unable to determine as declined Class 2    Date to report next: weekly     Ana Shultz, MS, RD, CDECS  Diabetes Educator  [facility] Maternal Fetal Medicine  Diabetes in Pregnancy Program  [address], [address]  [place] [address]

## 2024-05-23 NOTE — PROGRESS NOTES
Assessment     Pregnancy 36 and 1/7 weeks     Plan     Pleasant 29-year-old female presenting for routine OB visit third trimester.  Stable bilateral foot swelling, occasional numbness and stiffness in bilateral hands.  Recommend Tylenol if needed, wrist braces and salt restriction.  Lower extremity elevation, sodium restriction and compression stockings for swelling.  Will monitor.    Blood pressure today 112/62, trace protein, trace leuks otherwise normal urine.   with fetal movement appreciated.  A+ blood type.      Patient reminded M follow-up 6/7 for growth.    She will continue checking her sugars for management of diet-controlled GDM, diabetic diet and follow-up with GDM management team through [facility].    Stressed kick counts   GBS collected today   Patient to continue PNV, staying well-hydrated drinking plenty of water, exercise as tolerated.    Okay to DC ASA since after 36 weeks.      Signs/symptoms labor, preeclampsia reviewed with patient today.    Follow up in 1 Week.      Chief Complaint   Patient presents with    Routine Prenatal Visit     Swelling in feet, denies any head aches, burred vision, or flashers.         Subjective     Alexandra Huerta is a 29 y.o. female being seen today for her obstetrical visit. She is at 36w1d gestation.  She is doing well overall without any new concerns or progressive complaints today.  Patient reports no bleeding, no contractions, no cramping, and no leaking.  She denies any headache, dizziness, blurry vision, abdominal pain, nausea/vomiting or reflux.  Normal bowel movement and urination.  Fetal movement: normal.    Taking PNV, ASA ran out - can stop now since 36 wks.     Diet controlled GDM.  FBS 65- 70's, nothing > 90.  PP generally < 120. One time 136 after eating bread.     Numbness in hands sometimes in hands with minimal swelling and stiffness, especially in AM or at night when sleeping. Not bothersome as much during the day.   Minimal swelling bilateral  "feet    Menstrual History:  OB History          3    Para   0    Term   0       0    AB   2    Living   0         SAB   2    IAB   0    Ectopic   0    Multiple   0    Live Births   0                Menarche age: N/A  Patient's last menstrual period was 2023 (exact date).       The following portions of the patient's history were reviewed and updated as appropriate: allergies, current medications, past family history, past medical history, past social history, past surgical history, and problem list.    Review of Systems  Pertinent items are noted in HPI.     Objective     /62 (BP Location: Left arm, Patient Position: Sitting, Cuff Size: Adult)   Ht 4' 11\" (1.499 m)   Wt 80.7 kg (178 lb)   LMP 2023 (Exact Date)   BMI 35.95 kg/m²   FHT: 144 BPM   Uterine Size: 36 cm and size equals dates   Presentation: unsure              "

## 2024-05-25 LAB — GP B STREP DNA SPEC QL NAA+PROBE: NEGATIVE

## 2024-05-28 ENCOUNTER — ROUTINE PRENATAL (OUTPATIENT)
Dept: OBGYN CLINIC | Facility: CLINIC | Age: 29
End: 2024-05-28

## 2024-05-28 VITALS
BODY MASS INDEX: 35.88 KG/M2 | WEIGHT: 178 LBS | SYSTOLIC BLOOD PRESSURE: 104 MMHG | DIASTOLIC BLOOD PRESSURE: 70 MMHG | HEIGHT: 59 IN

## 2024-05-28 DIAGNOSIS — Z3A.36 36 WEEKS GESTATION OF PREGNANCY: Primary | ICD-10-CM

## 2024-05-28 PROCEDURE — PNV: Performed by: OBSTETRICS & GYNECOLOGY

## 2024-05-28 NOTE — PROGRESS NOTES
Patient presents at 36 weeks 6 days gestation for routine prenatal exam.  She denies decreased fetal movement, loss of fluid, contractions or pain.  She is still working on her birth plan but has expressed desire not to have any exposure to Pitocin.  This was discussed, and she is most likely willing to accept the Pitocin postpartum.  She may be likely to accept it as augmentation rather than for induction.

## 2024-05-29 ENCOUNTER — TELEPHONE (OUTPATIENT)
Dept: OBGYN CLINIC | Facility: CLINIC | Age: 29
End: 2024-05-29

## 2024-06-04 ENCOUNTER — ROUTINE PRENATAL (OUTPATIENT)
Dept: OBGYN CLINIC | Facility: CLINIC | Age: 29
End: 2024-06-04
Payer: COMMERCIAL

## 2024-06-04 VITALS
SYSTOLIC BLOOD PRESSURE: 102 MMHG | HEART RATE: 80 BPM | BODY MASS INDEX: 36.21 KG/M2 | WEIGHT: 179.6 LBS | HEIGHT: 59 IN | DIASTOLIC BLOOD PRESSURE: 68 MMHG

## 2024-06-04 DIAGNOSIS — Z3A.37 37 WEEKS GESTATION OF PREGNANCY: Primary | ICD-10-CM

## 2024-06-04 LAB
SL AMB  POCT GLUCOSE, UA: NEGATIVE
SL AMB LEUKOCYTE ESTERASE,UA: NEGATIVE
SL AMB POCT NITRITE,UA: NEGATIVE
SL AMB POCT URINE PROTEIN: NORMAL

## 2024-06-04 PROCEDURE — PNV: Performed by: OBSTETRICS & GYNECOLOGY

## 2024-06-04 PROCEDURE — 81002 URINALYSIS NONAUTO W/O SCOPE: CPT | Performed by: OBSTETRICS & GYNECOLOGY

## 2024-06-04 NOTE — PROGRESS NOTES
Patient presents at 37 weeks 6 days gestation for routine prenatal exam.  She denies decreased fetal movement, loss of fluid, vaginal bleeding, contractions or pain.  She declines pelvic exam at this visit.  Will see her back in 1 week or as needed.

## 2024-06-07 ENCOUNTER — ULTRASOUND (OUTPATIENT)
Facility: HOSPITAL | Age: 29
End: 2024-06-07
Payer: COMMERCIAL

## 2024-06-07 VITALS
HEIGHT: 59 IN | SYSTOLIC BLOOD PRESSURE: 100 MMHG | HEART RATE: 84 BPM | DIASTOLIC BLOOD PRESSURE: 68 MMHG | WEIGHT: 178.8 LBS | BODY MASS INDEX: 36.04 KG/M2

## 2024-06-07 DIAGNOSIS — O99.213 OBESITY AFFECTING PREGNANCY IN THIRD TRIMESTER, UNSPECIFIED OBESITY TYPE: ICD-10-CM

## 2024-06-07 DIAGNOSIS — Z3A.38 38 WEEKS GESTATION OF PREGNANCY: ICD-10-CM

## 2024-06-07 DIAGNOSIS — O24.410 DIET CONTROLLED GESTATIONAL DIABETES MELLITUS (GDM) IN THIRD TRIMESTER: ICD-10-CM

## 2024-06-07 DIAGNOSIS — Z36.2 ENCOUNTER FOR FOLLOW-UP ULTRASOUND OF FETAL ANATOMY: ICD-10-CM

## 2024-06-07 DIAGNOSIS — Z36.89 ENCOUNTER FOR ULTRASOUND TO CHECK FETAL GROWTH: Primary | ICD-10-CM

## 2024-06-07 PROCEDURE — 76816 OB US FOLLOW-UP PER FETUS: CPT | Performed by: STUDENT IN AN ORGANIZED HEALTH CARE EDUCATION/TRAINING PROGRAM

## 2024-06-07 NOTE — PROGRESS NOTES
This patient received  care under my supervision on 24 at 38w2d gestational age at [facility].  The note is contained in the ultrasound report located under OB Procedures tab in Epic.  Please call our office at [phone] with questions.  -Tracy Shea MD

## 2024-06-11 ENCOUNTER — ROUTINE PRENATAL (OUTPATIENT)
Dept: OBGYN CLINIC | Facility: CLINIC | Age: 29
End: 2024-06-11

## 2024-06-11 VITALS — BODY MASS INDEX: 36.15 KG/M2 | DIASTOLIC BLOOD PRESSURE: 66 MMHG | WEIGHT: 179 LBS | SYSTOLIC BLOOD PRESSURE: 108 MMHG

## 2024-06-11 DIAGNOSIS — Z3A.38 38 WEEKS GESTATION OF PREGNANCY: Primary | ICD-10-CM

## 2024-06-11 PROCEDURE — PNV: Performed by: OBSTETRICS & GYNECOLOGY

## 2024-06-11 NOTE — PROGRESS NOTES
Alexandra Huerta is [id number], here for her routine ob appt; pt denies any LOF, VB, or CTXs.   SDOH survey completed.  Pt would not like to be checked today.  +FM?:  yes

## 2024-06-11 NOTE — PROGRESS NOTES
Patient presents at 38 weeks 6 days gestation.The patient denies decreased fetal movement, loss of fluid, vaginal bleeding, contractions or pain.  She declines examination at this time.  She also declines induction at 39 weeks.  We will see her back in 1 week or as needed.

## 2024-06-18 ENCOUNTER — ROUTINE PRENATAL (OUTPATIENT)
Dept: OBGYN CLINIC | Facility: CLINIC | Age: 29
End: 2024-06-18
Payer: COMMERCIAL

## 2024-06-18 VITALS — BODY MASS INDEX: 36.68 KG/M2 | SYSTOLIC BLOOD PRESSURE: 110 MMHG | DIASTOLIC BLOOD PRESSURE: 68 MMHG | WEIGHT: 181.6 LBS

## 2024-06-18 DIAGNOSIS — Z3A.39 39 WEEKS GESTATION OF PREGNANCY: Primary | ICD-10-CM

## 2024-06-18 LAB
SL AMB  POCT GLUCOSE, UA: NEGATIVE
SL AMB LEUKOCYTE ESTERASE,UA: NEGATIVE
SL AMB POCT CLARITY,UA: NORMAL
SL AMB POCT COLOR,UA: NORMAL
SL AMB POCT NITRITE,UA: NEGATIVE
SL AMB POCT URINE PROTEIN: NEGATIVE

## 2024-06-18 PROCEDURE — 87491 CHLMYD TRACH DNA AMP PROBE: CPT | Performed by: OBSTETRICS & GYNECOLOGY

## 2024-06-18 PROCEDURE — 87591 N.GONORRHOEAE DNA AMP PROB: CPT | Performed by: OBSTETRICS & GYNECOLOGY

## 2024-06-18 PROCEDURE — 81003 URINALYSIS AUTO W/O SCOPE: CPT | Performed by: OBSTETRICS & GYNECOLOGY

## 2024-06-18 PROCEDURE — PNV: Performed by: OBSTETRICS & GYNECOLOGY

## 2024-06-18 NOTE — PROGRESS NOTES
The patient presents at 39 weeks 6 days gestation.The patient denies decreased fetal movement, loss of fluid, vaginal bleeding, contractions or pain.  She declines pelvic exam at this time we will see her back in 1 week or as needed.

## 2024-06-18 NOTE — PROGRESS NOTES
Alexandra Huerta is [id number], here for her routine ob appt; pt denies any LOF, VB, or CTXs.  GC/c culture to be collected via urine, not collected at prior appt.  Pt does not want to be checked today.  +FM?:  yes

## 2024-06-19 LAB
C TRACH DNA SPEC QL NAA+PROBE: NEGATIVE
N GONORRHOEA DNA SPEC QL NAA+PROBE: NEGATIVE

## 2024-06-20 ENCOUNTER — PATIENT MESSAGE (OUTPATIENT)
Dept: PERINATAL CARE | Facility: CLINIC | Age: 29
End: 2024-06-20

## 2024-06-21 ENCOUNTER — DOCUMENTATION (OUTPATIENT)
Dept: PERINATAL CARE | Facility: CLINIC | Age: 29
End: 2024-06-21

## 2024-06-21 NOTE — PROGRESS NOTES
Date: 06/21/24  Alexandra Huerta  1995  Estimated Date of Delivery: 6/19/24  40w2d  OB/GYN:[place]    Diagnosis:  Diet controlled GDM    Blood Sugar Logs Submitted via: Glucose Flowsheet      Last reported BG was 6/14/24 FBS    Assessment and Plan:  No diabetes related medications. Has declined to Attend Class 2. Many missing BG results.   1800 calorie (CHO: 45-15-45-15-45-30) (PRO:2-1-3-1-3-2) meal plan consisting of 3 meals and 3 snacks daily including protein at each  Advised patient to continue current meal plan  Avoid fasting for > 10 hours overnight  Continue SMBG 4 x per day (Fasting, 2 hour after start of each meal) with a One-Touch Verio glucose meter  If okay by physician, recommend up to 30 minutes of physical activity daily     Lab Work:   Lab Results   Component Value Date    HGBA1C 5.3 02/06/2024      Ultrasound:       Date:6/7/24       Fetal Growth: Normal       SANDEEP: Normal  Next None    Diabetes Self Management Support Plan outside of ongoing care: Spouse/Family   Patient Stated Goal:  Pateint will avoid muffins & desserts for the remainder of the pregnancy    Goal Assessment:  unable to determine as declined Class 2    Date to report next: weekly     Ana Shultz, MS, RD, CDECS  Diabetes Educator  [facility] Maternal Fetal Medicine  Diabetes in Pregnancy Program  [address], [address]  [place] [address]

## 2024-06-22 ENCOUNTER — HOSPITAL ENCOUNTER (INPATIENT)
Facility: HOSPITAL | Age: 29
LOS: 1 days | Discharge: HOME | End: 2024-06-23
Attending: OBSTETRICS & GYNECOLOGY | Admitting: OBSTETRICS & GYNECOLOGY
Payer: COMMERCIAL

## 2024-06-22 ENCOUNTER — ANESTHESIA (INPATIENT)
Dept: ANESTHESIOLOGY | Facility: HOSPITAL | Age: 29
End: 2024-06-22
Payer: COMMERCIAL

## 2024-06-22 ENCOUNTER — ANESTHESIA EVENT (INPATIENT)
Dept: ANESTHESIOLOGY | Facility: HOSPITAL | Age: 29
End: 2024-06-22
Payer: COMMERCIAL

## 2024-06-22 ENCOUNTER — NURSE TRIAGE (OUTPATIENT)
Dept: OTHER | Facility: OTHER | Age: 29
End: 2024-06-22

## 2024-06-22 DIAGNOSIS — Z3A.36 36 WEEKS GESTATION OF PREGNANCY: Primary | ICD-10-CM

## 2024-06-22 PROBLEM — Z3A.40 40 WEEKS GESTATION OF PREGNANCY: Status: ACTIVE | Noted: 2024-03-21

## 2024-06-22 PROBLEM — O47.9 UTERINE CONTRACTIONS DURING PREGNANCY: Status: ACTIVE | Noted: 2024-06-22

## 2024-06-22 LAB
ABO GROUP BLD: NORMAL
BASE EXCESS BLDCOA CALC-SCNC: -11.2 MMOL/L (ref 3–11)
BASE EXCESS BLDCOV CALC-SCNC: -8.8 MMOL/L (ref 1–9)
BLD GP AB SCN SERPL QL: NEGATIVE
ERYTHROCYTE [DISTWIDTH] IN BLOOD BY AUTOMATED COUNT: 14.5 % (ref 11.6–15.1)
GLUCOSE SERPL-MCNC: 70 MG/DL (ref 65–140)
GLUCOSE SERPL-MCNC: 74 MG/DL (ref 65–140)
GLUCOSE SERPL-MCNC: 79 MG/DL (ref 65–140)
GLUCOSE SERPL-MCNC: 81 MG/DL (ref 65–140)
HCO3 BLDCOA-SCNC: 19.6 MMOL/L (ref 17.3–27.3)
HCO3 BLDCOV-SCNC: 17.9 MMOL/L (ref 12.2–28.6)
HCT VFR BLD AUTO: 36.9 % (ref 34.8–46.1)
HGB BLD-MCNC: 12.7 G/DL (ref 11.5–15.4)
HOLD SPECIMEN: NORMAL
MCH RBC QN AUTO: 31.1 PG (ref 26.8–34.3)
MCHC RBC AUTO-ENTMCNC: 34.4 G/DL (ref 31.4–37.4)
MCV RBC AUTO: 90 FL (ref 82–98)
O2 CT VFR BLDCOA CALC: 6.3 ML/DL
OXYHGB MFR BLDCOA: 27 %
OXYHGB MFR BLDCOV: 69.4 %
PCO2 BLDCOA: 64.6 MM[HG] (ref 30–60)
PCO2 BLDCOV: 41.5 MM HG (ref 27–43)
PH BLDCOA: 7.1 [PH] (ref 7.23–7.43)
PH BLDCOV: 7.25 [PH] (ref 7.19–7.49)
PLATELET # BLD AUTO: 230 THOUSANDS/UL (ref 149–390)
PMV BLD AUTO: 11.8 FL (ref 8.9–12.7)
PO2 BLDCOA: 19 MM HG (ref 5–25)
PO2 BLDCOV: 34.1 MM HG (ref 15–45)
RBC # BLD AUTO: 4.08 MILLION/UL (ref 3.81–5.12)
RH BLD: POSITIVE
SAO2 % BLDCOV: 16 ML/DL
SPECIMEN EXPIRATION DATE: NORMAL
TREPONEMA PALLIDUM IGG+IGM AB [PRESENCE] IN SERUM OR PLASMA BY IMMUNOASSAY: NORMAL
WBC # BLD AUTO: 13.03 THOUSAND/UL (ref 4.31–10.16)

## 2024-06-22 PROCEDURE — 0HQ9XZZ REPAIR PERINEUM SKIN, EXTERNAL APPROACH: ICD-10-PCS | Performed by: OBSTETRICS & GYNECOLOGY

## 2024-06-22 PROCEDURE — 10907ZC DRAINAGE OF AMNIOTIC FLUID, THERAPEUTIC FROM PRODUCTS OF CONCEPTION, VIA NATURAL OR ARTIFICIAL OPENING: ICD-10-PCS | Performed by: OBSTETRICS & GYNECOLOGY

## 2024-06-22 PROCEDURE — 86780 TREPONEMA PALLIDUM: CPT

## 2024-06-22 PROCEDURE — 99213 OFFICE O/P EST LOW 20 MIN: CPT

## 2024-06-22 PROCEDURE — 4A1HXCZ MONITORING OF PRODUCTS OF CONCEPTION, CARDIAC RATE, EXTERNAL APPROACH: ICD-10-PCS | Performed by: OBSTETRICS & GYNECOLOGY

## 2024-06-22 PROCEDURE — 86900 BLOOD TYPING SEROLOGIC ABO: CPT

## 2024-06-22 PROCEDURE — 59400 OBSTETRICAL CARE: CPT | Performed by: OBSTETRICS & GYNECOLOGY

## 2024-06-22 PROCEDURE — 86850 RBC ANTIBODY SCREEN: CPT

## 2024-06-22 PROCEDURE — 86901 BLOOD TYPING SEROLOGIC RH(D): CPT

## 2024-06-22 PROCEDURE — NC001 PR NO CHARGE: Performed by: OBSTETRICS & GYNECOLOGY

## 2024-06-22 PROCEDURE — 82805 BLOOD GASES W/O2 SATURATION: CPT | Performed by: OBSTETRICS & GYNECOLOGY

## 2024-06-22 PROCEDURE — 85027 COMPLETE CBC AUTOMATED: CPT

## 2024-06-22 PROCEDURE — 82948 REAGENT STRIP/BLOOD GLUCOSE: CPT

## 2024-06-22 PROCEDURE — G0463 HOSPITAL OUTPT CLINIC VISIT: HCPCS

## 2024-06-22 RX ORDER — BENZOCAINE/MENTHOL 6 MG-10 MG
1 LOZENGE MUCOUS MEMBRANE DAILY PRN
Status: DISCONTINUED | OUTPATIENT
Start: 2024-06-22 | End: 2024-06-23 | Stop reason: HOSPADM

## 2024-06-22 RX ORDER — OXYTOCIN/RINGER'S LACTATE 30/500 ML
250 PLASTIC BAG, INJECTION (ML) INTRAVENOUS CONTINUOUS
Status: ACTIVE | OUTPATIENT
Start: 2024-06-22 | End: 2024-06-22

## 2024-06-22 RX ORDER — DIPHENHYDRAMINE HCL 25 MG
25 TABLET ORAL EVERY 6 HOURS PRN
Status: DISCONTINUED | OUTPATIENT
Start: 2024-06-22 | End: 2024-06-23 | Stop reason: HOSPADM

## 2024-06-22 RX ORDER — IBUPROFEN 600 MG/1
600 TABLET ORAL EVERY 6 HOURS SCHEDULED
Status: DISCONTINUED | OUTPATIENT
Start: 2024-06-22 | End: 2024-06-23 | Stop reason: HOSPADM

## 2024-06-22 RX ORDER — OXYTOCIN/RINGER'S LACTATE 30/500 ML
PLASTIC BAG, INJECTION (ML) INTRAVENOUS
Status: DISPENSED
Start: 2024-06-22 | End: 2024-06-23

## 2024-06-22 RX ORDER — ACETAMINOPHEN 325 MG/1
650 TABLET ORAL EVERY 6 HOURS SCHEDULED
Status: DISCONTINUED | OUTPATIENT
Start: 2024-06-22 | End: 2024-06-23 | Stop reason: HOSPADM

## 2024-06-22 RX ORDER — LIDOCAINE HYDROCHLORIDE AND EPINEPHRINE 15; 5 MG/ML; UG/ML
INJECTION, SOLUTION EPIDURAL AS NEEDED
Status: DISCONTINUED | OUTPATIENT
Start: 2024-06-22 | End: 2024-06-23 | Stop reason: HOSPADM

## 2024-06-22 RX ORDER — BUPIVACAINE HYDROCHLORIDE 2.5 MG/ML
30 INJECTION, SOLUTION EPIDURAL; INFILTRATION; INTRACAUDAL ONCE AS NEEDED
Status: DISCONTINUED | OUTPATIENT
Start: 2024-06-22 | End: 2024-06-22

## 2024-06-22 RX ORDER — SODIUM CHLORIDE, SODIUM LACTATE, POTASSIUM CHLORIDE, CALCIUM CHLORIDE 600; 310; 30; 20 MG/100ML; MG/100ML; MG/100ML; MG/100ML
125 INJECTION, SOLUTION INTRAVENOUS CONTINUOUS
Status: DISCONTINUED | OUTPATIENT
Start: 2024-06-22 | End: 2024-06-22

## 2024-06-22 RX ORDER — DOCUSATE SODIUM 100 MG/1
100 CAPSULE, LIQUID FILLED ORAL 2 TIMES DAILY
Status: DISCONTINUED | OUTPATIENT
Start: 2024-06-22 | End: 2024-06-23 | Stop reason: HOSPADM

## 2024-06-22 RX ORDER — ONDANSETRON 2 MG/ML
4 INJECTION INTRAMUSCULAR; INTRAVENOUS EVERY 8 HOURS PRN
Status: DISCONTINUED | OUTPATIENT
Start: 2024-06-22 | End: 2024-06-23 | Stop reason: HOSPADM

## 2024-06-22 RX ORDER — SIMETHICONE 80 MG
80 TABLET,CHEWABLE ORAL 4 TIMES DAILY PRN
Status: DISCONTINUED | OUTPATIENT
Start: 2024-06-22 | End: 2024-06-23 | Stop reason: HOSPADM

## 2024-06-22 RX ORDER — CALCIUM CARBONATE 500 MG/1
1000 TABLET, CHEWABLE ORAL 3 TIMES DAILY PRN
Status: DISCONTINUED | OUTPATIENT
Start: 2024-06-22 | End: 2024-06-23 | Stop reason: HOSPADM

## 2024-06-22 RX ORDER — MAGNESIUM HYDROXIDE/ALUMINUM HYDROXICE/SIMETHICONE 120; 1200; 1200 MG/30ML; MG/30ML; MG/30ML
15 SUSPENSION ORAL EVERY 6 HOURS PRN
Status: DISCONTINUED | OUTPATIENT
Start: 2024-06-22 | End: 2024-06-23 | Stop reason: HOSPADM

## 2024-06-22 RX ORDER — SENNOSIDES 8.6 MG
1 TABLET ORAL DAILY
Status: DISCONTINUED | OUTPATIENT
Start: 2024-06-23 | End: 2024-06-23 | Stop reason: HOSPADM

## 2024-06-22 RX ADMIN — ROPIVACAINE HYDROCHLORIDE: 2 INJECTION, SOLUTION EPIDURAL; INFILTRATION at 11:47

## 2024-06-22 RX ADMIN — LIDOCAINE HYDROCHLORIDE AND EPINEPHRINE 3 ML: 15; 5 INJECTION, SOLUTION EPIDURAL at 11:44

## 2024-06-22 RX ADMIN — SODIUM CHLORIDE, SODIUM LACTATE, POTASSIUM CHLORIDE, AND CALCIUM CHLORIDE 125 ML/HR: .6; .31; .03; .02 INJECTION, SOLUTION INTRAVENOUS at 16:38

## 2024-06-22 RX ADMIN — SODIUM CHLORIDE, SODIUM LACTATE, POTASSIUM CHLORIDE, AND CALCIUM CHLORIDE 500 ML/HR: .6; .31; .03; .02 INJECTION, SOLUTION INTRAVENOUS at 09:15

## 2024-06-22 RX ADMIN — BENZOCAINE AND LEVOMENTHOL 1 APPLICATION: 200; 5 SPRAY TOPICAL at 18:41

## 2024-06-22 RX ADMIN — ACETAMINOPHEN 650 MG: 325 TABLET, FILM COATED ORAL at 19:44

## 2024-06-22 RX ADMIN — LIDOCAINE HYDROCHLORIDE AND EPINEPHRINE 2 ML: 15; 5 INJECTION, SOLUTION EPIDURAL at 11:46

## 2024-06-22 RX ADMIN — Medication 250 MILLI-UNITS/MIN: at 18:05

## 2024-06-22 RX ADMIN — HYDROCORTISONE 1 APPLICATION: 1 CREAM TOPICAL at 18:41

## 2024-06-22 RX ADMIN — WITCH HAZEL 1 PAD: 500 SOLUTION RECTAL; TOPICAL at 18:41

## 2024-06-22 RX ADMIN — SODIUM CHLORIDE, SODIUM LACTATE, POTASSIUM CHLORIDE, AND CALCIUM CHLORIDE 125 ML/HR: .6; .31; .03; .02 INJECTION, SOLUTION INTRAVENOUS at 14:07

## 2024-06-22 RX ADMIN — SODIUM CHLORIDE, SODIUM LACTATE, POTASSIUM CHLORIDE, AND CALCIUM CHLORIDE 999 ML/HR: .6; .31; .03; .02 INJECTION, SOLUTION INTRAVENOUS at 11:29

## 2024-06-22 RX ADMIN — DOCUSATE SODIUM 100 MG: 100 CAPSULE, LIQUID FILLED ORAL at 18:41

## 2024-06-22 NOTE — TELEPHONE ENCOUNTER
Patient called while arriving at hospital Gestation:40W3D  ZIGGY:     Contractions Q5-10 mins for the last 1 ½ hours, with mild BLE cramping. Baseline FM. No additional symptoms.     On call provider contacted and informed of patient's concerns and status.      L&D charge nurse notified.

## 2024-06-22 NOTE — ANESTHESIA PREPROCEDURE EVALUATION
Procedure:  LABOR ANALGESIA    Relevant Problems   GYN   (+) 40 weeks gestation of pregnancy      NEURO/PSYCH   (+) Situational anxiety      Obstetrics/Gynecology   (+) Diet controlled gestational diabetes mellitus (GDM) in third trimester   (+) Obesity during pregnancy in first trimester      Respiratory/Allergy   (+) Reactive airways dysfunction syndrome (HCC)        Lab Results   Component Value Date    WBC 13.03 (H) 06/22/2024    HGB 12.7 06/22/2024    HCT 36.9 06/22/2024    MCV 90 06/22/2024     06/22/2024     Lab Results   Component Value Date    SODIUM 136 10/31/2023    K 3.9 10/31/2023     10/31/2023    CO2 20 (L) 10/31/2023    BUN 7 10/31/2023    CREATININE 0.70 10/31/2023    GLUC 85 10/31/2023    CALCIUM 9.5 10/31/2023         Physical Exam    Airway    Mallampati score: II         Dental   No notable dental hx     Cardiovascular  Rhythm: regular, Rate: normal, Cardiovascular exam normal    Pulmonary  Pulmonary exam normal     Other Findings  post-pubertal.      Anesthesia Plan  ASA Score- 2     Anesthesia Type- epidural with ASA Monitors.         Additional Monitors:     Airway Plan:            Plan Factors-Exercise tolerance (METS): >4 METS.    Chart reviewed. EKG reviewed.  Existing labs reviewed. Patient summary reviewed.    Patient is not a current smoker. Patient not instructed to abstain from smoking on day of procedure. Patient did not smoke on day of surgery.            Induction-     Postoperative Plan-     Perioperative Resuscitation Plan - Level 1 - Full Code.       Informed Consent- Anesthetic plan and risks discussed with patient.  I personally reviewed this patient with the CRNA. Discussed and agreed on the Anesthesia Plan with the CRNA..

## 2024-06-22 NOTE — DISCHARGE SUMMARY
Discharge Summary - OB/GYN   Alexandra Huerta 29 y.o. female MRN: [id number]  Unit/Bed#: -01 Encounter: [id number]      Admission Date: 2024     Discharge Date: 2024    Admitting Diagnosis:   Patient Active Problem List   Diagnosis    Reactive airways dysfunction syndrome (HCC)    Situational anxiety    Rubella non-immune status, antepartum    Obesity during pregnancy in first trimester    Family history of diabetes during pregnancy     (spontaneous vaginal delivery)    Diet controlled gestational diabetes mellitus (GDM) in third trimester    Uterine contractions during pregnancy        Discharge Diagnosis:   Same, delivered    Procedures: spontaneous vaginal delivery    Delivery Attending: Enedina Coughlin MD  Discharge Attending: Zunilda Partida MD    Hospital Course:   Alexandra Huerta is a 29 y.o. now  at 40w3d wks who was initially admitted for labor. Her pregnancy was complicated by A1GDM. She presented with painful uterine contractions; SVE on presentation was 4/90/-1. She made progress on her own and her labor was managed expectantly. Amniotomy occurred with subsequent SVE for meconium fluid. She progressed to complete and began pushing.     She delivered a viable female  on 24 at 1804. Weight 6lbs 7oz via spontaneous vaginal delivery. Apgars were 8 (1 min) and 8 (5 min).  was transferred to NICU due to meconium aspiration. Patient tolerated the procedure well and was transferred to recovery in stable condition.     Her postpartum course was uncomplicated. Her postpartum pain was well controlled with oral analgesics.    On day of discharge, she was ambulating and able to reasonably perform all ADLs. She was voiding and had appropriate bowel function. Pain was well controlled. She was discharged home on post-partum day #1 without complications. Patient was instructed to follow up with her OB as an outpatient and was given appropriate warnings to call provider if she  develops signs of infection or uncontrolled pain.    Complications: none apparent    Condition at discharge: good     Discharge instructions/Information to patient and family:   - Do not place anything (no partner, tampons or douche) in your vagina for 6 weeks.  -You may walk for exercise for the first 6 weeks then gradually return to your usual activities.   -Please do not drive for 1 week if you have no stitches and for 2 weeks if you have stitches or underwent a  delivery.    -You may take baths or shower per your preference.   -Please look at your bust (breasts) in the mirror daily and call for redness or tenderness or increased warmth.   -Please call us for temperature > 100.4*F or 38* C, worsening pain or a foul discharge.      Discharge Medications:   Prenatal vitamin daily for 6 months or the duration of nursing whichever is longer.  Motrin 600 mg orally every 6 hours as needed for pain  Tylenol (over the counter) per bottle directions as needed for pain: do NOT use with percocet  Hydrocortisone cream 1% (over the counter) applied 1-2x daily to hemorrhoids as needed    Planned Readmission: No    Rashida Jarvis MD  PGY-1 OBGYN

## 2024-06-22 NOTE — PLAN OF CARE
Problem: Knowledge Deficit  Goal: Verbalizes understanding of labor plan  Description: Assess patient/family/caregiver's baseline knowledge level and ability to understand information.  Provide education via patient/family/caregiver's preferred learning method at appropriate level of understanding.     1. Provide teaching at level of understanding.  2. Provide teaching via preferred learning method(s).  Outcome: Progressing  Goal: Patient/family/caregiver demonstrates understanding of disease process, treatment plan, medications, and discharge instructions  Description: Complete learning assessment and assess knowledge base.  Interventions:  - Provide teaching at level of understanding  - Provide teaching via preferred learning methods  Outcome: Progressing     Problem: Labor & Delivery  Goal: Manages discomfort  Description: Assess and monitor for signs and symptoms of discomfort.  Assess patient's pain level regularly and per hospital policy.  Administer medications as ordered. Support use of nonpharmacological methods to help control pain such as distraction, imagery, relaxation, and application of heat and cold.  Collaborate with interdisciplinary team and patient to determine appropriate pain management plan.    1. Include patient in decisions related to comfort.  2. Offer non-pharmacological pain management interventions.  3. Report ineffective pain management to physician.  Outcome: Completed  Goal: Patient vital signs are stable  Description: 1. Assess vital signs - vaginal delivery.  Outcome: Completed     Problem: PAIN - ADULT  Goal: Verbalizes/displays adequate comfort level or baseline comfort level  Description: Interventions:  - Encourage patient to monitor pain and request assistance  - Assess pain using appropriate pain scale  - Administer analgesics based on type and severity of pain and evaluate response  - Implement non-pharmacological measures as appropriate and evaluate response  - Consider  cultural and social influences on pain and pain management  - Notify physician/advanced practitioner if interventions unsuccessful or patient reports new pain  Outcome: Progressing     Problem: INFECTION - ADULT  Goal: Absence or prevention of progression during hospitalization  Description: INTERVENTIONS:  - Assess and monitor for signs and symptoms of infection  - Monitor lab/diagnostic results  - Monitor all insertion sites, i.e. indwelling lines, tubes, and drains  - Monitor endotracheal if appropriate and nasal secretions for changes in amount and color  - [place] appropriate cooling/warming therapies per order  - Administer medications as ordered  - Instruct and encourage patient and family to use good hand hygiene technique  - Identify and instruct in appropriate isolation precautions for identified infection/condition  Outcome: Progressing  Goal: Absence of fever/infection during neutropenic period  Description: INTERVENTIONS:  - Monitor WBC    Outcome: Progressing     Problem: SAFETY ADULT  Goal: Patient will remain free of falls  Description: INTERVENTIONS:  - Educate patient/family on patient safety including physical limitations  - Instruct patient to call for assistance with activity   - Consult OT/PT to assist with strengthening/mobility   - Keep Call bell within reach  - Keep bed low and locked with side rails adjusted as appropriate  - Keep care items and personal belongings within reach  - Initiate and maintain comfort rounds  - Make Fall Risk Sign visible to staff    - Apply yellow socks and bracelet for high fall risk patients  - Consider moving patient to room near nurses station  Outcome: Progressing  Goal: Maintain or return to baseline ADL function  Description: INTERVENTIONS:  -  Assess patient's ability to carry out ADLs; assess patient's baseline for ADL function and identify physical deficits which impact ability to perform ADLs (bathing, care of mouth/teeth, toileting, grooming,  dressing, etc.)  - Assess/evaluate cause of self-care deficits   - Assess range of motion  - Assess patient's mobility; develop plan if impaired  - Assess patient's need for assistive devices and provide as appropriate  - Encourage maximum independence but intervene and supervise when necessary  - Involve family in performance of ADLs  - Assess for home care needs following discharge   - Consider OT consult to assist with ADL evaluation and planning for discharge  - Provide patient education as appropriate  Outcome: Progressing  Goal: Maintains/Returns to pre admission functional level  Description: INTERVENTIONS:  - Perform AM-PAC 6 Click Basic Mobility/ Daily Activity assessment daily.  - Set and communicate daily mobility goal to care team and patient/family/caregiver.   - Collaborate with rehabilitation services on mobility goals if consulted  - Out of bed for toileting  - Record patient progress and toleration of activity level   Outcome: Progressing     Problem: DISCHARGE PLANNING  Goal: Discharge to home or other facility with appropriate resources  Description: INTERVENTIONS:  - Identify barriers to discharge w/patient and caregiver  - Arrange for needed discharge resources and transportation as appropriate  - Identify discharge learning needs (meds, wound care, etc.)  - Arrange for interpretive services to assist at discharge as needed  - Refer to Case Management Department for coordinating discharge planning if the patient needs post-hospital services based on physician/advanced practitioner order or complex needs related to functional status, cognitive ability, or social support system  Outcome: Progressing     Problem: POSTPARTUM  Goal: Experiences normal postpartum course  Description: INTERVENTIONS:  - Monitor maternal vital signs  - Assess uterine involution and lochia  Outcome: Progressing  Goal: Appropriate maternal -  bonding  Description: INTERVENTIONS:  - Identify family support  - Assess  for appropriate maternal/infant bonding   -Encourage maternal/infant bonding opportunities  - Referral to  or  as needed  Outcome: Progressing  Goal: Establishment of infant feeding pattern  Description: INTERVENTIONS:  - Assess breast/bottle feeding  - Refer to lactation as needed  Outcome: Progressing  Goal: Incision(s), wounds(s) or drain site(s) healing without S/S of infection  Description: INTERVENTIONS  - Assess and document dressing, incision, wound bed, drain sites and surrounding tissue  Outcome: Progressing

## 2024-06-22 NOTE — OB LABOR/OXYTOCIN SAFETY PROGRESS
Labor Progress Note - Alexandra Huerta 29 y.o. female MRN: [id number]    Unit/Bed#: -01 Encounter: [id number]       Contraction Frequency (minutes): 4.5-6  Contraction Intensity: Moderate  Uterine Activity Characteristics: Regular  Cervical Dilation: 6        Cervical Effacement: 100  Fetal Station: -1  Baseline Rate (FHR): 155 bpm  Fetal Heart Rate (FHT): 150 BPM  FHR Category: 1               Vital Signs:   Vitals:    06/22/24 1029   BP: 95/55   Pulse: 67   Resp: 18   Temp: 97.5 °F (36.4 °C)   SpO2:        Patient very uncomfortable with contractions, requesting epidural.  SVE as above. FHT cat 1 at this time, but just got back on the monitor from going to the bathroom. Gareth regularly. Plan to call anesthesia team for epidural placement. Proceed with expectant management. D/w Dr. Coughlin.     Rashida Jarvis MD 6/22/2024 11:20 AM

## 2024-06-22 NOTE — PLAN OF CARE
Problem: Knowledge Deficit  Goal: Verbalizes understanding of labor plan  Description: Assess patient/family/caregiver's baseline knowledge level and ability to understand information.  Provide education via patient/family/caregiver's preferred learning method at appropriate level of understanding.     1. Provide teaching at level of understanding.  2. Provide teaching via preferred learning method(s).  Outcome: Progressing  Goal: Patient/family/caregiver demonstrates understanding of disease process, treatment plan, medications, and discharge instructions  Description: Complete learning assessment and assess knowledge base.  Interventions:  - Provide teaching at level of understanding  - Provide teaching via preferred learning methods  Outcome: Progressing     Problem: Labor & Delivery  Goal: Manages discomfort  Description: Assess and monitor for signs and symptoms of discomfort.  Assess patient's pain level regularly and per hospital policy.  Administer medications as ordered. Support use of nonpharmacological methods to help control pain such as distraction, imagery, relaxation, and application of heat and cold.  Collaborate with interdisciplinary team and patient to determine appropriate pain management plan.    1. Include patient in decisions related to comfort.  2. Offer non-pharmacological pain management interventions.  3. Report ineffective pain management to physician.  Outcome: Progressing  Goal: Patient vital signs are stable  Description: 1. Assess vital signs - vaginal delivery.  Outcome: Progressing     Problem: PAIN - ADULT  Goal: Verbalizes/displays adequate comfort level or baseline comfort level  Description: Interventions:  - Encourage patient to monitor pain and request assistance  - Assess pain using appropriate pain scale  - Administer analgesics based on type and severity of pain and evaluate response  - Implement non-pharmacological measures as appropriate and evaluate response  -  Consider cultural and social influences on pain and pain management  - Notify physician/advanced practitioner if interventions unsuccessful or patient reports new pain  Outcome: Progressing     Problem: INFECTION - ADULT  Goal: Absence or prevention of progression during hospitalization  Description: INTERVENTIONS:  - Assess and monitor for signs and symptoms of infection  - Monitor lab/diagnostic results  - Monitor all insertion sites, i.e. indwelling lines, tubes, and drains  - Monitor endotracheal if appropriate and nasal secretions for changes in amount and color  - [place] appropriate cooling/warming therapies per order  - Administer medications as ordered  - Instruct and encourage patient and family to use good hand hygiene technique  - Identify and instruct in appropriate isolation precautions for identified infection/condition  Outcome: Progressing  Goal: Absence of fever/infection during neutropenic period  Description: INTERVENTIONS:  - Monitor WBC    Outcome: Progressing     Problem: SAFETY ADULT  Goal: Patient will remain free of falls  Description: INTERVENTIONS:  - Educate patient/family on patient safety including physical limitations  - Instruct patient to call for assistance with activity   - Consult OT/PT to assist with strengthening/mobility   - Keep Call bell within reach  - Keep bed low and locked with side rails adjusted as appropriate  - Keep care items and personal belongings within reach  - Initiate and maintain comfort rounds  - Make Fall Risk Sign visible to staff  - Offer Toileting every 2-3 Hours, in advance of need  - Apply yellow socks and bracelet for high fall risk patients  - Consider moving patient to room near nurses station  Outcome: Progressing  Goal: Maintain or return to baseline ADL function  Description: INTERVENTIONS:  -  Assess patient's ability to carry out ADLs; assess patient's baseline for ADL function and identify physical deficits which impact ability to perform  ADLs (bathing, care of mouth/teeth, toileting, grooming, dressing, etc.)  - Assess/evaluate cause of self-care deficits   - Assess range of motion  - Assess patient's mobility; develop plan if impaired  - Assess patient's need for assistive devices and provide as appropriate  - Encourage maximum independence but intervene and supervise when necessary  - Involve family in performance of ADLs  - Assess for home care needs following discharge   - Consider OT consult to assist with ADL evaluation and planning for discharge  - Provide patient education as appropriate  Outcome: Progressing  Goal: Maintains/Returns to pre admission functional level  Description: INTERVENTIONS:  - Perform AM-PAC 6 Click Basic Mobility/ Daily Activity assessment daily.  - Set and communicate daily mobility goal to care team and patient/family/caregiver.   - Collaborate with rehabilitation services on mobility goals if consulted  - Out of bed for toileting  - Record patient progress and toleration of activity level   Outcome: Progressing     Problem: DISCHARGE PLANNING  Goal: Discharge to home or other facility with appropriate resources  Description: INTERVENTIONS:  - Identify barriers to discharge w/patient and caregiver  - Arrange for needed discharge resources and transportation as appropriate  - Identify discharge learning needs (meds, wound care, etc.)  - Arrange for interpretive services to assist at discharge as needed  - Refer to Case Management Department for coordinating discharge planning if the patient needs post-hospital services based on physician/advanced practitioner order or complex needs related to functional status, cognitive ability, or social support system  Outcome: Progressing

## 2024-06-22 NOTE — TELEPHONE ENCOUNTER
"Regardinw pregnant/ contractions  ----- Message from Laure YE sent at 2024  6:17 AM EDT -----  \" I am having contractions. \"    "

## 2024-06-22 NOTE — ASSESSMENT & PLAN NOTE
"Lab Results   Component Value Date    HGBA1C 5.3 02/06/2024       A1GDM protocol ordered (q2 POC in latent phase, q1  POC in active phase)    No results for input(s): \"POCGLU\" in the last 72 hours.    Blood Sugar Average: Last 72 hrs:      "

## 2024-06-22 NOTE — OB LABOR/OXYTOCIN SAFETY PROGRESS
Labor Progress Note - Alexandra Huerta 29 y.o. female MRN: [id number]    Unit/Bed#: -01 Encounter: [id number]       Contraction Frequency (minutes): 6-7  Contraction Intensity: Moderate  Uterine Activity Characteristics: Regular  Cervical Dilation: 7        Cervical Effacement: 100  Fetal Station: 0  Baseline Rate (FHR): 130 bpm  Fetal Heart Rate (FHT): 140 BPM  FHR Category: 1               Vital Signs:   Vitals:    06/22/24 1303   BP: 110/66   Pulse: 70   Resp:    Temp:    SpO2:        Patient comfortable with epidural.  SVE as above; amniotomy occurred with SVE for meconium-stained fluid. FHT cat 1. Gareth q6-7 regularly. Continue with expectant management. D/w Dr. Coughlin.       Rashida Jarvis MD 6/22/2024 2:03 PM

## 2024-06-22 NOTE — ASSESSMENT & PLAN NOTE
Gareth painfully, SVE 4/90/-1  Admitted for labor  Admission labs - CBC, T&S, syphilis screen  Clear liquid diet  Anesthesia consult placed   Rh+, Rhogam not indicated   GBS-, prophylaxis not indicated  Continue with expectant management

## 2024-06-22 NOTE — L&D DELIVERY NOTE
Vaginal Delivery Summary - OB/GYN   Alexandra Huerta 29 y.o. female MRN: [id number]  Unit/Bed#: -01 Encounter: [id number]    Pre-delivery Diagnosis:   1. Pregnancy at 40 weeks   2. A1GDM  3. Remote history of asthma    Post-delivery Diagnosis: same, delivered    Procedure: Spontaneous Vaginal Delivery     Attending: Dr. Coughlin    Assistant(s): Dr. Jarvis    Anesthesia: Epidural    QBL: 52 cc    Complications: none apparent    Specimens:   1. Arterial and venous cord gases  2. Cord blood  3. Segment of umbilical cord  4. Placenta to storage     Findings:  1. Viable female on 2024 at 1804, with APGARS pending  2. Spontaneous delivery of intact placenta at 1809  3. First degree laceration repaired with 3-0 vicryl rapide  4. Umbilical Cord Venous Blood Gas:  Results from last 7 days   Lab Units 24   PH COV  7.253   PCO2 COV mm HG 41.5   HCO3 COV mmol/L 17.9   BASE EXC COV mmol/L -8.8*   O2 CT CD VB mL/dL 16.0   O2 HGB, VENOUS CORD % 69.4     5. Umbilical Cord Arterial Blood Gas: insufficient sample    Disposition:  Patient tolerated the procedure well and was recovering in labor and delivery room       Brief history and labor course:  Alexandra Huerta is a 29 y.o. now  at 40w3d wks who was initially admitted for labor. Her pregnancy was complicated by A1GDM. She presented with painful uterine contractions; SVE on presentation was 4/90/-1. She made progress on her own and her labor was managed expectantly. Amniotomy occurred with subsequent SVE for meconium fluid. She progressed to complete and began pushing.    Description of procedure:  After pushing for 25 minutes, at 1804 patient delivered a viable female , wt pending, apgars pending. The fetal vertex delivered spontaneously. There was no nuchal cord. Baby was OA, restituted to XAVIER. The right anterior shoulder delivered atraumatically with maternal expulsive forces and the assistance of gentle downward traction. The left posterior  shoulder delivered with maternal expulsive forces and the assistance of gentle upward traction. The remainder of the fetus delivered spontaneously.     Upon delivery, the infant was placed on the mothers abdomen and the cord was clamped and cut. The infant was noted to cry spontaneously and was moving all extremities appropriately. There was no evidence for injury. Awaiting nurse resuscitators evaluated the . Arterial and venous cord blood gases and cord blood was collected for analysis. These were promptly sent to the lab. In the immediate post-partum, 30 units of IV pitocin was administered, and the uterus was noted to contract down well with massage and pitocin. The placenta delivered spontaneously at 1809 and was noted to have a centrally inserted 3 vessel cord.     The vagina, cervix, perineum, and rectum were inspected and there was noted to be a first degree laceration requiring repair. Patient was comfortable with epidural at that time. Laceration was repaired in standard fashion with 3-0 Vicryl rapid to reapproximate the laceration. Good reapproximation and hemostasis was confirmed at the conclusion of this procedure.    At the conclusion of the procedure, all needle, sponge, and instrument counts were noted to be correct. Patient tolerated the procedure well and was allowed to recover in labor and delivery room with family and  before being transferred to the post-partum floor. Dr. Coughlin was present and participated in all key portions of the case.    Rashida Jarvis MD  OB/GYN PGY-1  2024  6:28 PM

## 2024-06-22 NOTE — H&P
"H & P- Obstetrics   Alexandra Huerta 29 y.o. female MRN: [id number]  Unit/Bed#: LD TRIAGE  Encounter: [id number]    Assessment: 29 y.o.  at 40w3d admitted for labor .  SVE:   FHT: cat1  Clinical EFW: 32%; Cephalic confirmed by TAUS  GBS status: negative     Plan:   Diet controlled gestational diabetes mellitus (GDM) in third trimester  Assessment & Plan  Lab Results   Component Value Date    HGBA1C 5.3 2024       A1GDM protocol ordered (q2 POC in latent phase, q1  POC in active phase)    No results for input(s): \"POCGLU\" in the last 72 hours.    Blood Sugar Average: Last 72 hrs:        40 weeks gestation of pregnancy  Assessment & Plan  IUP   PNL s/f elevated 1hr gtt meeting criteria for GDM  A+  GBS neg  EFW [place] 4 3091 grams - 6 lbs 13 oz (32%)   Cephalic presentation     Rubella non-immune status, antepartum  Assessment & Plan  Offer MMR postpartum    * Uterine contractions during pregnancy  Assessment & Plan  Gareth painfully, SVE   Admitted for labor  Admission labs - CBC, T&S, syphilis screen  Clear liquid diet  Anesthesia consult placed   Rh+, Rhogam not indicated   GBS-, prophylaxis not indicated  Continue with expectant management        Discussed case and plan w/ Dr. Coughlin.    Chief Complaint: contractions    HPI: Alexandra Huerta is a 29 y.o.  with an ZIGGY of 2024, by Ultrasound at 40w3d who is being admitted for labor . She complains of painful uterine contractions, occurring every 5 minutes, has no LOF, and reports no VB. She states she has felt good FM.    Patient Active Problem List   Diagnosis    Reactive airways dysfunction syndrome (HCC)    Situational anxiety    Rubella non-immune status, antepartum    Obesity during pregnancy in first trimester    Family history of diabetes during pregnancy    40 weeks gestation of pregnancy    Diet controlled gestational diabetes mellitus (GDM) in third trimester    Uterine contractions during pregnancy       Baby " complications/comments: none    Review of Systems   Constitutional:  Negative for chills and fever.   Respiratory:  Negative for cough and shortness of breath.    Gastrointestinal:  Negative for diarrhea, nausea and vomiting.   Genitourinary:  Negative for dysuria and hematuria.   Skin:  Negative for color change and rash.   Neurological:  Negative for dizziness, syncope and headaches.       OB Hx:  OB History    Para Term  AB Living   3 0 0 0 2 0   SAB IAB Ectopic Multiple Live Births   2 0 0 0 0      # Outcome Date GA Lbr Ta/2nd Weight Sex Type Anes PTL Lv   3 Current            2 SAB 2021     Biochemical      1 SAB 2020               Past Medical Hx:  Past Medical History:   Diagnosis Date    Anxiety     Asthma     Female infertility First miscarriage, 2020. Second miscarriage 2022, possible 3rd recently    Miscarriage 2020    I miscarried again, 2022, shortly after taking a pregnancy test that displayed a faint line    Reactive airway disease        Past Surgical hx:  Past Surgical History:   Procedure Laterality Date    TONGUE BIOPSY      WISDOM TOOTH EXTRACTION       Social Hx:  Alcohol use: denies  Tobacco use: denies  Other substance use: denies    No Known Allergies      Medications Prior to Admission:     Prenatal Multivit-Min-Fe-FA (PRE-TIFFANY PO)    aspirin 81 mg chewable tablet    Blood Glucose Monitoring Suppl (OneTouch Verio Flex System) w/Device KIT    Lancets (OneTouch Delica Plus [id number]) MISC    OneTouch Verio test strip    Objective:  Temp:  [97.9 °F (36.6 °C)] 97.9 °F (36.6 °C)  HR:  [81] 81  Resp:  [18] 18  BP: (118)/(63) 118/63  Body mass index is 36.56 kg/m².     Physical Exam:  Physical Exam  Constitutional:       Appearance: Normal appearance.   Genitourinary:      Vulva normal.   Cardiovascular:      Rate and Rhythm: Normal rate.   Pulmonary:      Effort: Pulmonary effort is normal.   Abdominal:      Palpations: Abdomen is soft.    Neurological:      General: No focal deficit present.      Mental Status: She is alert and oriented to person, place, and time.   Skin:     General: Skin is dry.   Psychiatric:         Mood and Affect: Mood normal.        FHT:  Baseline Rate (FHR): 145 bpm  Variability: Moderate  Decelerations: Variable, Geo not <80 bpm, For < 60 seconds  FHR Category: Category II    TOCO:   Contraction Frequency (minutes): 4-6  Contraction Duration (seconds): 120-180  Contraction Intensity: Moderate    Lab Results   Component Value Date    WBC 11.42 (H) 03/29/2024    HGB 12.2 03/29/2024    HCT 36.2 03/29/2024     03/29/2024     Lab Results   Component Value Date    K 3.9 10/31/2023     10/31/2023    CO2 20 (L) 10/31/2023    BUN 7 10/31/2023    CREATININE 0.70 10/31/2023    AST 35 10/31/2023    ALT 53 10/31/2023     Prenatal Labs: Reviewed      Blood type: A+  Antibody: negative  GBS: negative  HIV: non-reactive  Rubella: non-immune  Syphilis IgM/IgG: non-reactive  HBsAg: non-reactive  HCAb: non-reactive  Chlamydia: negative  Gonorrhea: negative  Diabetes 1 hour screen: elevated at 192 mg/dL  3 hour glucose: n/a  Platelets: 255, pending admission CBC  Hgb: 12.2, pending admission CBC  >2 Midnights  INPATIENT     Signature/Title: Rashida Jarvis MD  Date: 6/22/2024  Time: 8:09 AM

## 2024-06-22 NOTE — ANESTHESIA POSTPROCEDURE EVALUATION
Post-Op Assessment Note    CV Status:  Stable  Pain Score: 0    Pain management: adequate      Post-op block assessment: adhesive bandage applied, catheter intact, no complications and site cleaned   Mental Status:  Alert and awake   Hydration Status:  Euvolemic   PONV Controlled:  Controlled   Airway Patency:  Patent     Post Op Vitals Reviewed: Yes    No anethesia notable event occurred.    Staff: CRNA               BP      Temp      Pulse     Resp      SpO2

## 2024-06-22 NOTE — TELEPHONE ENCOUNTER
"Reason for Disposition  • [1] First baby (primipara) AND [2] contractions > 5 minutes apart, or for < 2 hours    Answer Assessment - Initial Assessment Questions  1. ONSET: \"When did the symptoms begin?\"         0230  2. CONTRACTIONS: \"Describe the contractions that you are having.\" (e.g., duration, frequency, regularity, severity)      Q5-10 mins for the last hour and 30 mins   3. ZIGGY: \"What date are you expecting to deliver?\"      4. PARITY: \"Have you had a baby before?\" If Yes, ask: \"How long did the labor last?\"        5. FETAL MOVEMENT: \"Has the baby's movement decreased or changed significantly from normal?\"      Baseline   6. OTHER SYMPTOMS: \"Do you have any other symptoms?\" (e.g., leaking fluid from vagina, vaginal bleeding, fever, hand/facial swelling)  Mild BLE cramping    Protocols used: Pregnancy - Labor-ADULT-AH    "

## 2024-06-22 NOTE — ANESTHESIA PROCEDURE NOTES
Epidural Block    Patient location during procedure: OB/L&D  Start time: 6/22/2024 11:42 AM  Reason for block: procedure for pain  Staffing  Performed by: Hubert Pederson CRNA  Authorized by: Noah Arizmendi MD    Preanesthetic Checklist  Completed: patient identified, IV checked, site marked, risks and benefits discussed, surgical consent, monitors and equipment checked, pre-op evaluation and timeout performed  Epidural  Patient position: sitting  Prep: ChloraPrep  Sedation Level: no sedation  Patient monitoring: frequent blood pressure checks, continuous pulse oximetry and heart rate  Approach: midline  Location: lumbar, L3-4  Injection technique: KARISHMA saline  Needle  Needle type: Tuohy   Needle gauge: 18 G  Needle insertion depth: 5.5 cm  Catheter type: multi-orifice  Catheter size: 20 G  Catheter at skin depth: 12 cm  Catheter securement method: stabilization device and clear occlusive dressing  Test dose: negative  Assessment  Sensory level: T10  Number of attempts: 1negative aspiration for CSF, negative aspiration for heme and no paresthesia on injection  patient tolerated the procedure well with no immediate complications  Additional Notes  Attempts x 1; KARISHMA @ 5.5cm; catheter taped at 12cm

## 2024-06-23 VITALS
HEART RATE: 71 BPM | TEMPERATURE: 97.7 F | WEIGHT: 181 LBS | BODY MASS INDEX: 36.49 KG/M2 | SYSTOLIC BLOOD PRESSURE: 103 MMHG | DIASTOLIC BLOOD PRESSURE: 58 MMHG | RESPIRATION RATE: 17 BRPM | OXYGEN SATURATION: 98 % | HEIGHT: 59 IN

## 2024-06-23 LAB — PLACENTA IN STORAGE: NORMAL

## 2024-06-23 PROCEDURE — 99024 POSTOP FOLLOW-UP VISIT: CPT | Performed by: OBSTETRICS & GYNECOLOGY

## 2024-06-23 PROCEDURE — NC001 PR NO CHARGE: Performed by: OBSTETRICS & GYNECOLOGY

## 2024-06-23 RX ORDER — IBUPROFEN 600 MG/1
600 TABLET ORAL EVERY 6 HOURS PRN
Start: 2024-06-23

## 2024-06-23 RX ORDER — ACETAMINOPHEN 325 MG/1
650 TABLET ORAL EVERY 6 HOURS PRN
Start: 2024-06-23

## 2024-06-23 RX ADMIN — SENNOSIDES 8.6 MG: 8.6 TABLET, FILM COATED ORAL at 09:29

## 2024-06-23 RX ADMIN — ACETAMINOPHEN 650 MG: 325 TABLET, FILM COATED ORAL at 00:20

## 2024-06-23 RX ADMIN — WITCH HAZEL 1 PAD: 500 SOLUTION RECTAL; TOPICAL at 15:21

## 2024-06-23 RX ADMIN — ACETAMINOPHEN 650 MG: 325 TABLET, FILM COATED ORAL at 05:20

## 2024-06-23 RX ADMIN — DOCUSATE SODIUM 100 MG: 100 CAPSULE, LIQUID FILLED ORAL at 09:29

## 2024-06-23 RX ADMIN — ACETAMINOPHEN 650 MG: 325 TABLET, FILM COATED ORAL at 14:13

## 2024-06-23 RX ADMIN — DOCUSATE SODIUM 100 MG: 100 CAPSULE, LIQUID FILLED ORAL at 18:36

## 2024-06-23 NOTE — PLAN OF CARE
Problem: PAIN - ADULT  Goal: Verbalizes/displays adequate comfort level or baseline comfort level  Description: Interventions:  - Encourage patient to monitor pain and request assistance  - Assess pain using appropriate pain scale  - Administer analgesics based on type and severity of pain and evaluate response  - Implement non-pharmacological measures as appropriate and evaluate response  - Consider cultural and social influences on pain and pain management  - Notify physician/advanced practitioner if interventions unsuccessful or patient reports new pain  Outcome: Completed     Problem: POSTPARTUM  Goal: Experiences normal postpartum course  Description: INTERVENTIONS:  - Monitor maternal vital signs  - Assess uterine involution and lochia  Outcome: Completed

## 2024-06-23 NOTE — PLAN OF CARE
Problem: PAIN - ADULT  Goal: Verbalizes/displays adequate comfort level or baseline comfort level  Description: Interventions:  - Encourage patient to monitor pain and request assistance  - Assess pain using appropriate pain scale  - Administer analgesics based on type and severity of pain and evaluate response  - Implement non-pharmacological measures as appropriate and evaluate response  - Consider cultural and social influences on pain and pain management  - Notify physician/advanced practitioner if interventions unsuccessful or patient reports new pain  Outcome: Progressing     Problem: INFECTION - ADULT  Goal: Absence or prevention of progression during hospitalization  Description: INTERVENTIONS:  - Assess and monitor for signs and symptoms of infection  - Monitor lab/diagnostic results  - Monitor all insertion sites, i.e. indwelling lines, tubes, and drains  - Monitor endotracheal if appropriate and nasal secretions for changes in amount and color  - [place] appropriate cooling/warming therapies per order  - Administer medications as ordered  - Instruct and encourage patient and family to use good hand hygiene technique  - Identify and instruct in appropriate isolation precautions for identified infection/condition  Outcome: Progressing  Goal: Absence of fever/infection during neutropenic period  Description: INTERVENTIONS:  - Monitor WBC    Outcome: Progressing     Problem: SAFETY ADULT  Goal: Patient will remain free of falls  Description: INTERVENTIONS:  - Educate patient/family on patient safety including physical limitations  - Instruct patient to call for assistance with activity   - Consult OT/PT to assist with strengthening/mobility   - Keep Call bell within reach  - Keep bed low and locked with side rails adjusted as appropriate  - Keep care items and personal belongings within reach  - Initiate and maintain comfort rounds  - Make Fall Risk Sign visible to staff  - Offer Toileting every  Hours,  in advance of need  - Initiate/Maintain alarm  - Obtain necessary fall risk management equipment:   - Apply yellow socks and bracelet for high fall risk patients  - Consider moving patient to room near nurses station  Outcome: Progressing  Goal: Maintain or return to baseline ADL function  Description: INTERVENTIONS:  -  Assess patient's ability to carry out ADLs; assess patient's baseline for ADL function and identify physical deficits which impact ability to perform ADLs (bathing, care of mouth/teeth, toileting, grooming, dressing, etc.)  - Assess/evaluate cause of self-care deficits   - Assess range of motion  - Assess patient's mobility; develop plan if impaired  - Assess patient's need for assistive devices and provide as appropriate  - Encourage maximum independence but intervene and supervise when necessary  - Involve family in performance of ADLs  - Assess for home care needs following discharge   - Consider OT consult to assist with ADL evaluation and planning for discharge  - Provide patient education as appropriate  Outcome: Progressing  Goal: Maintains/Returns to pre admission functional level  Description: INTERVENTIONS:  - Perform AM-PAC 6 Click Basic Mobility/ Daily Activity assessment daily.  - Set and communicate daily mobility goal to care team and patient/family/caregiver.   - Collaborate with rehabilitation services on mobility goals if consulted  - Perform Range of Motion  times a day.  - Reposition patient every  hours.  - Dangle patient  times a day  - Stand patient  times a day  - Ambulate patient  times a day  - Out of bed to chair  times a day   - Out of bed for meals  times a day  - Out of bed for toileting  - Record patient progress and toleration of activity level   Outcome: Progressing     Problem: DISCHARGE PLANNING  Goal: Discharge to home or other facility with appropriate resources  Description: INTERVENTIONS:  - Identify barriers to discharge w/patient and caregiver  - Arrange for  needed discharge resources and transportation as appropriate  - Identify discharge learning needs (meds, wound care, etc.)  - Arrange for interpretive services to assist at discharge as needed  - Refer to Case Management Department for coordinating discharge planning if the patient needs post-hospital services based on physician/advanced practitioner order or complex needs related to functional status, cognitive ability, or social support system  Outcome: Progressing     Problem: POSTPARTUM  Goal: Experiences normal postpartum course  Description: INTERVENTIONS:  - Monitor maternal vital signs  - Assess uterine involution and lochia  Outcome: Progressing  Goal: Appropriate maternal -  bonding  Description: INTERVENTIONS:  - Identify family support  - Assess for appropriate maternal/infant bonding   -Encourage maternal/infant bonding opportunities  - Referral to  or  as needed  Outcome: Progressing  Goal: Establishment of infant feeding pattern  Description: INTERVENTIONS:  - Assess breast/bottle feeding  - Refer to lactation as needed  Outcome: Progressing  Goal: Incision(s), wounds(s) or drain site(s) healing without S/S of infection  Description: INTERVENTIONS  - Assess and document dressing, incision, wound bed, drain sites and surrounding tissue  - Provide patient and family education  - Perform skin care/dressing changes every   Outcome: Progressing

## 2024-06-23 NOTE — PLAN OF CARE
Problem: PAIN - ADULT  Goal: Verbalizes/displays adequate comfort level or baseline comfort level  Description: Interventions:  - Encourage patient to monitor pain and request assistance  - Assess pain using appropriate pain scale  - Administer analgesics based on type and severity of pain and evaluate response  - Implement non-pharmacological measures as appropriate and evaluate response  - Consider cultural and social influences on pain and pain management  - Notify physician/advanced practitioner if interventions unsuccessful or patient reports new pain  6/23/2024 1908 by Myriam Diaz RN  Outcome: Adequate for Discharge  6/23/2024 0957 by Myriam Diaz RN  Outcome: Progressing     Problem: POSTPARTUM  Goal: Experiences normal postpartum course  Description: INTERVENTIONS:  - Monitor maternal vital signs  - Assess uterine involution and lochia  6/23/2024 1908 by Myriam Diaz RN  Outcome: Adequate for Discharge  6/23/2024 0957 by Myriam Diaz RN  Outcome: Progressing

## 2024-06-23 NOTE — PLAN OF CARE
Problem: PAIN - ADULT  Goal: Verbalizes/displays adequate comfort level or baseline comfort level  Description: Interventions:  - Encourage patient to monitor pain and request assistance  - Assess pain using appropriate pain scale  - Administer analgesics based on type and severity of pain and evaluate response  - Implement non-pharmacological measures as appropriate and evaluate response  - Consider cultural and social influences on pain and pain management  - Notify physician/advanced practitioner if interventions unsuccessful or patient reports new pain  Outcome: Progressing     Problem: INFECTION - ADULT  Goal: Absence or prevention of progression during hospitalization  Description: INTERVENTIONS:  - Assess and monitor for signs and symptoms of infection  - Monitor lab/diagnostic results  - Monitor all insertion sites, i.e. indwelling lines, tubes, and drains  - Monitor endotracheal if appropriate and nasal secretions for changes in amount and color  - [place] appropriate cooling/warming therapies per order  - Administer medications as ordered  - Instruct and encourage patient and family to use good hand hygiene technique  - Identify and instruct in appropriate isolation precautions for identified infection/condition  Outcome: Progressing  Goal: Absence of fever/infection during neutropenic period  Description: INTERVENTIONS:  - Monitor WBC    Outcome: Progressing     Problem: SAFETY ADULT  Goal: Patient will remain free of falls  Description: INTERVENTIONS:  - Educate patient/family on patient safety including physical limitations  - Instruct patient to call for assistance with activity   - Consult OT/PT to assist with strengthening/mobility   - Keep Call bell within reach  - Keep bed low and locked with side rails adjusted as appropriate  - Keep care items and personal belongings within reach  - Initiate and maintain comfort rounds  - Make Fall Risk Sign visible to staff  - Apply yellow socks and bracelet  for high fall risk patients  - Consider moving patient to room near nurses station  Outcome: Progressing  Goal: Maintain or return to baseline ADL function  Description: INTERVENTIONS:  -  Assess patient's ability to carry out ADLs; assess patient's baseline for ADL function and identify physical deficits which impact ability to perform ADLs (bathing, care of mouth/teeth, toileting, grooming, dressing, etc.)  - Assess/evaluate cause of self-care deficits   - Assess range of motion  - Assess patient's mobility; develop plan if impaired  - Assess patient's need for assistive devices and provide as appropriate  - Encourage maximum independence but intervene and supervise when necessary  - Involve family in performance of ADLs  - Assess for home care needs following discharge   - Consider OT consult to assist with ADL evaluation and planning for discharge  - Provide patient education as appropriate  Outcome: Progressing  Goal: Maintains/Returns to pre admission functional level  Description: INTERVENTIONS:  - Perform AM-PAC 6 Click Basic Mobility/ Daily Activity assessment daily.  - Set and communicate daily mobility goal to care team and patient/family/caregiver.   - Collaborate with rehabilitation services on mobility goals if consulted  - Out of bed for toileting  - Record patient progress and toleration of activity level   Outcome: Progressing     Problem: DISCHARGE PLANNING  Goal: Discharge to home or other facility with appropriate resources  Description: INTERVENTIONS:  - Identify barriers to discharge w/patient and caregiver  - Arrange for needed discharge resources and transportation as appropriate  - Identify discharge learning needs (meds, wound care, etc.)  - Arrange for interpretive services to assist at discharge as needed  - Refer to Case Management Department for coordinating discharge planning if the patient needs post-hospital services based on physician/advanced practitioner order or complex needs  related to functional status, cognitive ability, or social support system  Outcome: Progressing     Problem: POSTPARTUM  Goal: Experiences normal postpartum course  Description: INTERVENTIONS:  - Monitor maternal vital signs  - Assess uterine involution and lochia  Outcome: Progressing  Goal: Appropriate maternal -  bonding  Description: INTERVENTIONS:  - Identify family support  - Assess for appropriate maternal/infant bonding   -Encourage maternal/infant bonding opportunities  - Referral to  or  as needed  Outcome: Progressing  Goal: Establishment of infant feeding pattern  Description: INTERVENTIONS:  - Assess breast/bottle feeding  - Refer to lactation as needed  Outcome: Progressing  Goal: Incision(s), wounds(s) or drain site(s) healing without S/S of infection  Description: INTERVENTIONS  - Assess and document dressing, incision, wound bed, drain sites and surrounding tissue  - Provide patient and family education  Outcome: Progressing

## 2024-06-23 NOTE — LACTATION NOTE
This note was copied from a baby's chart.  CONSULT - LACTATION  Baby Girl (Fredy Huerta 1 days female MRN: [id number]    [facility] NURSERY Room / Bed: (N)/(N) Encounter: [id number]    Maternal Information     MOTHER:  Alexandra Huerta  Maternal Age: 29 y.o.  OB History: # 1 - Date: 2020, Sex: None, Weight: None, GA: None, Type: None, Apgar1: None, Apgar5: None, Living: None, Birth Comments: None    # 2 - Date: 2021, Sex: None, Weight: None, GA: None, Type: Biochemical, Apgar1: None, Apgar5: None, Living: None, Birth Comments: None    # 3 - Date: 24, Sex: Female, Weight: None, GA: 40w3d, Type: Vaginal, Spontaneous, Apgar1: 8, Apgar5: 8, Living: Living, Birth Comments: None   Previouse breast reduction surgery? No    Lactation history:   Has patient previously breast fed: No   How long had patient previously breast fed:     Previous breast feeding complications:       Past Surgical History:   Procedure Laterality Date    TONGUE BIOPSY      WISDOM TOOTH EXTRACTION         Birth information:  YOB: 2025   Time of birth: 6:04 PM   Sex: female   Delivery type: Vaginal, Spontaneous   Birth Weight: 2920 g (6 lb 7 oz)   Percent of Weight Change: -1%     Gestational Age: 40w3d   [unfilled]    Assessment     Breast and nipple assessment: normal assessment; Round breasts with dark areolas and everted, round nipples     Assessment:  late stage feeding cues    Feeding assessment:  Latching well to the breast with demonstration and teach back - ed. On how to est. Milk supply  LATCH:  Latch: Grasps breast, tongue down, lips flanged, rhythmic sucking   Audible Swallowing: Spontaneous and intermittent (24 hours old)   Type of Nipple: Everted (After stimulation)   Comfort (Breast/Nipple): Soft/non-tender   Hold (Positioning): Partial assist, teach one side, mother does other, staff holds   LATCH Score: 9          Feeding recommendations:  breast feed on  demand. Baby was in NICU after birth, but was returned in the parents in the postpartum unit late last night. FOB states mom     Mom has baby in her arms with pacifier. Baby spits out the pacifier and demonstrating late stage feeding cues.    Ed. On timing of feeds and early feeding cues.     Demonstration and teach back of hand expression. Demonstration and teach back of positioning and alignment.     Brought baby up to the left breast in cross cradle hold. Enc. Snug hold of baby at the breast. Demonstration and teach back of how to achieve a deep latch. Flanging of upper and lower lip demonstrated. Active, coordinated sucks observed.     Demonstration and teach back of breast compressions, benefits of NNS. Enc. Next feed to be s2s. Baby latched to the left breast for 20 min. Once unlatched, burping techniques reviewed.    Ed. On how to est. Milk supply     Mom demonstrated teach back of positioning in cross cradle. Teach back of flanging lips. Deep latch achieved with active, coordinated sucking.    RSB/DC reviewed with handouts  Handouts:   Mother-led latch,   Latch Checklist  Nipple Pain,   Pumping Log,   Nicu baby,   Increase Supply,  Alt. Feeding,    Mom has an [employer] stride from [employer].    Reviewed DBM. Reviewed how to purchase in hospital and at home. Enc. Mixed feeding if output is not met.    Ed. On pumping, (mom has not started yet), and use of hand pump. 21 mm flanges provided    Enc. To call lactation    Mom agrees to outpatient appt - sent to [facility]    Education on positioning and alignment. Mom is encouraged to:     - Bring baby up to the breast (use of pillows to elevate so baby's torso is against mom's breasts)   - Skin to skin for feedings with top hand exposed to show signs of satiation   - Chin deep into breast tissue (make baby look up to the nipple)   - nose aligned to the nipple   -Wait for wide gape, drag chin on the breast so nipple is aimed at the upper, back palate  - Cheek should be  "touching breast   - Deep, firm hold of baby with ear, shoulder, hip alignment    Latch assessment:  Education on skin to skin for feedings, hand expression demonstration with teach back. Use pillows to support arms & bring baby up to breast. Use \"C\" compression of breast and keep fingers away from face. Bring chin to breast, align nipple to nose, drag down to chin to achieve a wide open mouth and a deep latch to the breast.   Cheek to touch breast tissue with ear, shoulder hip alignment. Watch hands for signs of satiation. Use breast compressions to assist with milk transfer. Stimulate between breasts with burping techniques and diaper changes. Offer both breasts at every feeding session.     Milk Supply:   - Allow for non-nutritive suck at the breast to stimulate supply   - Allow for skin to skin during and after each breastfeeding session   - Use massage, heat, and hand expression prior to feedings to assist with deep latch   - Increase pumping sessions and pump after every feeding    Assistance with flanging baby's lips on the breast. Demonstration and teach-back of tugging on chin to flange lower lip and pressing digit into the breast, rolling digit under the upper lip and away from the oral cavity to flange upper lip while baby is latched. Education on how to achieve flanged lips with initial latch and positioning to the breast. Encouraged to un-latch and re-latch baby if mom has pain, discomfort, or if latch appears shallow. Encouraged to call lactation for assistance.     Demonstrated with teach back breast compressions during a feeding to increase milk transfer and stimulate suckling after a breathing/muscle break.     Mix Feeds:   Start every feeding at the breast. Offer both breasts or one breast and use breast compressions to achieve active suckling. Once baby is not actively suckling, bring baby in upright position and offer expressed milk and/or artificial supplementation via alternative feeding method " (syringe, finger, paced bottle feeding). Burp frequently between breasts and during paced bottle feeding. Once feed is complete, place baby back on breast for on-nutritive suck. Pump after the feeding session to supplement with expressed milk at next feed.      (Scan QR code for [employer] - positions)   Review Milkm[employer] on youtube or scan QR code for [employer] video      Milk Mob        [employer] - positions    Pumping:   - When pumping, begin in stimulation mode (high cycle, low vacuum) until milk begins to express. Change pump to expression mode (low cycle, high vacuum). Use hands on pumping techniques to assist with milk transfer. When milk stops expressing, change back to stimulation mode. When milk begins to flow, change to expression mode. You make cycle pump up to three times in a pumping session.    Information on hand expression given. Discussed benefits of knowing how to manually express breast including stimulating milk supply, softening nipple for latch and evacuating breast in the event of engorgement.    Mom is encouraged to place baby skin to skin for feedings. Skin to skin education provided for baby placement on mother's chest, baby only in diaper, blankets below shoulders on baby's back. Skin to skin is encouraged to continue at home for feedings and between feedings.    Worked on positioning infant up at chest level and starting to feed infant with nose arriving at the nipple. Then, using areolar compression to achieve a deep latch that is comfortable and exchanges optimum amounts of milk.     - Start feedings on breast that last feeding ended   - allow no more than 3 hours between breast feeding sessions   - time between feedings is counted from the beginning of the first feed to the beginning of the next feeding session    Reviewed early signs of hunger, including tensing of hands and shoulders - no need to wait for open eyes.  Crying is a late hunger sign.  If  baby is crying, soothe baby first and then attempt to latch.  Reviewed normal sucking patterns: transition from stimulation to nutritive to release or non-nutritive. The goal is to see and hear lots of swallowing.    Reviewed normal nursing pattern: infant could latch on one breast up to 30 minutes or until releases on own. Signs of satiation is open hand with fingers that do not grab your finger.  Discussed difference in sensation of non-nutritive v nutritive sucking    Met with mother. Provided mother with Ready, Set, Baby booklet.    Discussed Skin to Skin contact an benefits to mom and baby.  Talked about the delay of the first bath until baby has adjusted. Spoke about the benefits of rooming in. Feeding on cue and what that means for recognizing infant's hunger. Avoidance of pacifiers for the first month discussed. Talked about exclusive breastfeeding for the first 6 months.    Positioning and latch reviewed as well as showing images of other feeding positions.  Discussed the properties of a good latch in any position. Reviewed hand/manual expression.  Discussed s/s that baby is getting enough milk and some s/s that breastfeeding dyad may need further help.    Gave information on common concerns, what to expect the first few weeks after delivery, preparing for other caregivers, and how partners can help. Resources for support also provided.    Encouraged parents to call for assistance, questions, and concerns about breastfeeding.  Extension provided.    Provided education on growth spurts, when to introduce bottles; paced bottle feeding, and non-nutritive suck at the breast. Provided education on Signs of satiation. Encouraged to call lactation to observe a latch prior to discharge for reassurance. Encouraged to call baby and me with any questions and closely monitor output.        Christy Hume, MA 6/23/2024 11:44 AM

## 2024-06-23 NOTE — PROGRESS NOTES
"Obstetrics Progress Note  Alexandra Huerta 29 y.o. female MRN: [id number]  Unit/Bed#: -01 Encounter: [id number]    Assessment/Plan:    Postpartum Day #1 s/p , currently stable. Baby in room. By issue:    *  (spontaneous vaginal delivery)  Assessment & Plan       Continue routine post partum care  Pain well controlled: tylenol/motrin scheduled  Lochia within normal limits: continue to monitor   OOB: as able, encourage ambulation  Passing flatus  Voiding spontaneously  Breastfeeding  Baby in: room  Dispo: anticipate d/c home PPD1-2      Diet controlled gestational diabetes mellitus (GDM) in third trimester  Assessment & Plan  Lab Results   Component Value Date    HGBA1C 5.3 2024       A1GDM protocol ordered (q2 POC in latent phase, q1  POC in active phase)    No results for input(s): \"POCGLU\" in the last 72 hours.    Blood Sugar Average: Last 72 hrs:        Rubella non-immune status, antepartum  Assessment & Plan  Offer MMR postpartum      Subjective/Objective     Subjective:   No acute events overnight. Pain: controlled. Tolerating PO: yes. Voiding: yes. Flatus: passing. Ambulating: yes. Chest pain: no. Shortness of breath: no. Leg pain: no. Lochia: within normal limits. Baby in room, feeding via breast. She would like to go home today if baby is cleared.    Objective:   Vitals:   Temp:  [97.5 °F (36.4 °C)-99.3 °F (37.4 °C)] 98 °F (36.7 °C)  HR:  [67-93] 77  Resp:  [18-20] 18  BP: ()/(42-70) 100/59       Intake/Output Summary (Last 24 hours) at 2024 0308  Last data filed at 2024 0001  Gross per 24 hour   Intake 1339.35 ml   Output 2002 ml   Net -662.65 ml       Lab Results   Component Value Date    WBC 13.03 (H) 2024    HGB 12.7 2024    HCT 36.9 2024    MCV 90 2024     2024       Physical Exam:   General: alert and oriented x3, in no apparent distress  Cardiovascular: regular rate  Pulmonary: normal effort  Abdomen: Soft, non-tender, non-distended, no " rebound or guarding. Uterine fundus firm and non-tender, at the umbilicus  Extremities: Non tender with no edema    Rashida Jarvis MD  PGY-I, OBGYN  6/23/2024, 3:08 AM

## 2024-06-24 NOTE — UTILIZATION REVIEW
"NOTIFICATION OF INPATIENT ADMISSION   MATERNITY/DELIVERY AUTHORIZATION REQUEST   SERVICING FACILITY:   [facility]  [facility] - L&D, [facility], N[facility]  [address] [place] [address]  Tax ID: [id number]  NPI: [id number]   ATTENDING PROVIDER:  Attending Name and NPI#: Enedina Yan Md [[id number]]  Address: [address] [place] [address]  Phone: [phone]   ADMISSION INFORMATION:  Place of Service: Inpatient [facility]  Place of Service Code: 21  Inpatient Admission Date/Time: 24  7:58 AM  Discharge Date/Time: 2024  8:15 PM  Admitting Diagnosis Code/Description:  Pregnant [Z34.90]  40 weeks gestation of pregnancy [Z3A.40]  Uterine contractions during pregnancy [O47.9]  Encounter for full-term uncomplicated delivery [O80]     Mother: Alexandra Huerta 1995 Estimated Date of Delivery: 24  Delivering clinician: Enedina Yan   OB History          3    Para   1    Term   1       0    AB   2    Living   1         SAB   2    IAB   0    Ectopic   0    Multiple   0    Live Births   1                Name & MRN:   Information for the patient's :  Xiomara Huber Alda [[id number]]   [facility] Delivery Information:  Sex: female  Delivered 2024 6:04 PM by Vaginal, Spontaneous; Gestational Age: 40w3d    [facility] Measurements:  Weight: 6 lb 7 oz (2920 g);  Height: 18.75\"    APGAR 1 minute 5 minutes 10 minutes   Totals: 8 8       UTILIZATION REVIEW CONTACT:  Anna Roberts Utilization   Network Utilization Review Department  Phone: [phone]  Fax [phone]  Email: [email or web address]  Contact for approvals/pending authorizations, clinical reviews, and discharge.     PHYSICIAN ADVISORY SERVICES:  Medical Necessity Denial & Nvuh-dn-Ffba Review  Phone: [phone]  Fax: [phone]  Email: [email or web address]     DISCHARGE SUPPORT TEAM:  For Patients Discharge Needs & Updates  Phone: " [phone] opt. 2 Fax: [phone]  Email: [email or web address]

## 2024-06-27 ENCOUNTER — TELEPHONE (OUTPATIENT)
Dept: PERINATAL CARE | Facility: CLINIC | Age: 29
End: 2024-06-27

## 2024-06-27 DIAGNOSIS — Z86.32 HISTORY OF GESTATIONAL DIABETES MELLITUS (GDM), NOT CURRENTLY PREGNANT: ICD-10-CM

## 2024-06-27 DIAGNOSIS — Z13.1 DIABETES MELLITUS SCREENING: Primary | ICD-10-CM

## 2024-07-11 ENCOUNTER — TELEPHONE (OUTPATIENT)
Dept: OBGYN CLINIC | Facility: CLINIC | Age: 29
End: 2024-07-11

## 2024-07-11 NOTE — TELEPHONE ENCOUNTER
Called the patient and left a voice message  to return the call for her postpartum follow up call

## 2024-07-18 NOTE — TELEPHONE ENCOUNTER
Called the patient for a postpartum call ,left a voice message to return the call, also left a reminder to contact the office to schedule a postpartum appointment

## 2024-08-01 ENCOUNTER — POSTPARTUM VISIT (OUTPATIENT)
Dept: OBGYN CLINIC | Facility: CLINIC | Age: 29
End: 2024-08-01

## 2024-08-01 VITALS
WEIGHT: 170.8 LBS | HEIGHT: 59 IN | SYSTOLIC BLOOD PRESSURE: 102 MMHG | BODY MASS INDEX: 34.43 KG/M2 | DIASTOLIC BLOOD PRESSURE: 68 MMHG

## 2024-08-01 DIAGNOSIS — O24.410 DIET CONTROLLED GESTATIONAL DIABETES MELLITUS (GDM) IN THIRD TRIMESTER: ICD-10-CM

## 2024-08-01 PROCEDURE — 99024 POSTOP FOLLOW-UP VISIT: CPT | Performed by: PHYSICIAN ASSISTANT

## 2024-08-01 NOTE — PROGRESS NOTES
"OB POSTPARTUM VISIT PROGRESS NOTE  Date of Encounter: 2024    Alexandra Huerta    : 1995  (29 y.o.)  MR: [id number]    Subjective   Alexandra is in for her postpartum visit. She is now . Pregnancy complicated by GDMA1.  She delivered a healthy baby girl on 24 by normal spontaneous vaginal delivery. Delivery uncomplicated, though had grade 1 tear that was repaired after delivery. Baby was transferred to  NICU for short time for management due to meconium aspiration and was stable.   Patient's postpartum course was uncomplicated.     She's generally doing well, denies current pain or bleeding issues, and has no significant depression issues.   She is breast feeding exclusively.     We discussed all appropriate contraceptive options and she chooses None.    Substance abuse screen and PPD screen both negative.       Normal urination and BM's.  Denies pelvic pain.  States vaginal bleeding has since subsided.     Objective   EXAM:  GENERAL: alert, well appearing, and in no distress and oriented to person, place, and time.  VITALS: Blood pressure 102/68, height 4' 11\" (1.499 m), weight 77.5 kg (170 lb 12.8 oz), last menstrual period 2023, currently breastfeeding.  BMI: Body mass index is 34.5 kg/m².  NECK/THYROID: Commentnormal to palpation without palpable mass or asymmetry.   HEART: RRR S1S2 no murmur  LUNGS: Clear to auscultation.  BACK:  gross normal movement  BREASTS: Bilaterally nontender, no masses or nipple discharge.  ABDOMEN: soft, nontender  EXTREMITIES: No edema.  PELVIC:   VULVA: Nml EGBUS.   VAGINA: Normal, no discharge. No lesions. Introitus well healed, slightly tender.   CERVIX: Normal, no discharge. Nontender.   UTERUS:  nontender   RIGHT ADNEXUM: Nontender, no mass.   LEFT ADNEXUM: Nontender, no mass.   RECTAL: Normal, no lesions.        Assessment & Plan   Diagnoses and all orders for this visit:    Postpartum examination following vaginal delivery    Diet controlled gestational " diabetes mellitus (GDM) in third trimester     (spontaneous vaginal delivery)      Pleasant 29-year-old female presenting today for her postpartum 6-week follow-up.  Patient delivered a healthy baby girl via spontaneous vaginal delivery on .  Pregnancy course complicated by A1 GDM which was controlled.  Vaginal delivery without significant complication, repair of grade 1 tear.    sent to NICU short-term secondary to meconium aspiration without complication.  Postpartum course for patient uneventful.    Patient has no concern or complaint today.  Unremarkable exam.  Patient will continue breast-feeding.  Patient can slowly start returning back to normal activity as tolerated.  Patient to complete 2-hour GTT 8-12 weeks postpartum.  Can continue PNV for duration of breast-feeding.  Substance abuse screen, postpartum screenings all negative  Patient does not desire any contraception at this time, recommend condom.  Recommend consideration for MMR vaccine through PCP.      Last annual exam through [place] with Dr. Todd 2023.  RTO [place] location with Dr dia for annual exam, sooner if problems arise the interim.    Elizabeth Smith PA-C

## 2024-08-05 PROBLEM — O47.9 UTERINE CONTRACTIONS DURING PREGNANCY: Status: RESOLVED | Noted: 2024-06-22 | Resolved: 2024-08-05

## 2024-08-05 PROBLEM — O99.211 OBESITY DURING PREGNANCY IN FIRST TRIMESTER: Status: RESOLVED | Noted: 2023-12-02 | Resolved: 2024-08-05

## 2025-01-17 ENCOUNTER — ANNUAL EXAM (OUTPATIENT)
Dept: OBGYN CLINIC | Facility: CLINIC | Age: 30
End: 2025-01-17
Payer: COMMERCIAL

## 2025-01-17 VITALS
WEIGHT: 174.8 LBS | SYSTOLIC BLOOD PRESSURE: 102 MMHG | DIASTOLIC BLOOD PRESSURE: 64 MMHG | HEIGHT: 59 IN | BODY MASS INDEX: 35.24 KG/M2

## 2025-01-17 DIAGNOSIS — Z01.419 WOMEN'S ANNUAL ROUTINE GYNECOLOGICAL EXAMINATION: Primary | ICD-10-CM

## 2025-01-17 PROCEDURE — G0476 HPV COMBO ASSAY CA SCREEN: HCPCS | Performed by: OBSTETRICS & GYNECOLOGY

## 2025-01-17 PROCEDURE — 99395 PREV VISIT EST AGE 18-39: CPT | Performed by: OBSTETRICS & GYNECOLOGY

## 2025-01-17 PROCEDURE — G0145 SCR C/V CYTO,THINLAYER,RESCR: HCPCS | Performed by: OBSTETRICS & GYNECOLOGY

## 2025-01-17 NOTE — PROGRESS NOTES
"Rio Huerta is a 29 y.o. female who presents for annual well woman exam. Periods are rare, lasting a few days. No intermenstrual bleeding, spotting, or discharge.    Current contraception: condoms  History of abnormal Pap smear: no  Family history of uterine or ovarian cancer: no  Family history of breast cancer: no    Menstrual History:  OB History          3    Para   1    Term   1       0    AB   2    Living   1         SAB   2    IAB   0    Ectopic   0    Multiple   0    Live Births   1                  No LMP recorded (lmp unknown).       The following portions of the patient's history were reviewed and updated as appropriate: allergies, current medications, past family history, past medical history, past social history, past surgical history, and problem list.    Review of Systems  Review of Systems   Constitutional:  Negative for activity change, appetite change, chills, fatigue and fever.   Respiratory:  Negative for apnea, cough, chest tightness and shortness of breath.    Cardiovascular:  Negative for chest pain, palpitations and leg swelling.   Gastrointestinal:  Negative for abdominal pain, constipation, diarrhea, nausea and vomiting.   Genitourinary:  Negative for difficulty urinating, dysuria, flank pain, frequency, hematuria and urgency.   Neurological:  Negative for dizziness, seizures, syncope, light-headedness, numbness and headaches.   Psychiatric/Behavioral:  Negative for agitation and confusion.           Objective      /64 (BP Location: Left arm, Patient Position: Sitting, Cuff Size: Adult)   Ht 4' 11\" (1.499 m)   Wt 79.3 kg (174 lb 12.8 oz)   LMP  (LMP Unknown)   Breastfeeding Yes   BMI 35.31 kg/m²     Physical Exam  OBGyn Exam     General:   alert and oriented, in no acute distress, alert, appears stated age, and cooperative   Heart: regular rate and rhythm, S1, S2 normal, no murmur, click, rub or gallop   Lungs: clear to auscultation bilaterally "   Abdomen: soft, non-tender, without masses or organomegaly   Vulva: normal   Vagina: normal mucosa, normal discharge   Cervix: no cervical motion tenderness and no lesions   Uterus: normal size   Adnexa:  Breast Exam:  normal adnexa  breasts appear normal, no suspicious masses, no skin or nipple changes or axillary nodes.                Assessment      @well woman@ .  30 yo female   Annual exam     (FIRST MISACRRIAGE BLIGHTED OVIUM , second one very early  Miscarriage )  Recent vaginal delivery    Plan   Pap/r   Prenatal vitamin daily  Diet/exercsie  Continue breast-feeding  Return to office for annual exam   All questions answered.     There are no Patient Instructions on file for this visit.

## 2025-01-20 LAB
HPV HR 12 DNA CVX QL NAA+PROBE: NEGATIVE
HPV16 DNA CVX QL NAA+PROBE: NEGATIVE
HPV18 DNA CVX QL NAA+PROBE: NEGATIVE

## 2025-01-23 LAB
LAB AP GYN PRIMARY INTERPRETATION: NORMAL
Lab: NORMAL

## 2025-01-26 ENCOUNTER — RESULTS FOLLOW-UP (OUTPATIENT)
Dept: OBGYN CLINIC | Facility: CLINIC | Age: 30
End: 2025-01-26

## 2025-01-29 ENCOUNTER — TELEMEDICINE (OUTPATIENT)
Dept: OTHER | Facility: HOSPITAL | Age: 30
End: 2025-01-29
Payer: COMMERCIAL

## 2025-01-29 DIAGNOSIS — R11.0 NAUSEA: Primary | ICD-10-CM

## 2025-01-29 PROCEDURE — 99213 OFFICE O/P EST LOW 20 MIN: CPT | Performed by: PHYSICIAN ASSISTANT

## 2025-01-29 RX ORDER — ONDANSETRON 4 MG/1
4 TABLET, FILM COATED ORAL EVERY 8 HOURS PRN
Qty: 20 TABLET | Refills: 0 | Status: SHIPPED | OUTPATIENT
Start: 2025-01-29

## 2025-04-30 ENCOUNTER — OFFICE VISIT (OUTPATIENT)
Dept: FAMILY MEDICINE CLINIC | Facility: CLINIC | Age: 30
End: 2025-04-30
Payer: COMMERCIAL

## 2025-04-30 VITALS
TEMPERATURE: 98.4 F | OXYGEN SATURATION: 98 % | SYSTOLIC BLOOD PRESSURE: 118 MMHG | WEIGHT: 174 LBS | HEART RATE: 78 BPM | BODY MASS INDEX: 35.08 KG/M2 | DIASTOLIC BLOOD PRESSURE: 60 MMHG | HEIGHT: 59 IN

## 2025-04-30 DIAGNOSIS — Z13.1 ENCOUNTER FOR SCREENING EXAMINATION FOR IMPAIRED GLUCOSE REGULATION AND DIABETES MELLITUS: ICD-10-CM

## 2025-04-30 DIAGNOSIS — K14.8 TONGUE LESION: ICD-10-CM

## 2025-04-30 DIAGNOSIS — K21.9 GASTROESOPHAGEAL REFLUX DISEASE WITHOUT ESOPHAGITIS: ICD-10-CM

## 2025-04-30 DIAGNOSIS — Z13.0 SCREENING FOR DEFICIENCY ANEMIA: ICD-10-CM

## 2025-04-30 DIAGNOSIS — Z86.32 HISTORY OF GESTATIONAL DIABETES MELLITUS (GDM): ICD-10-CM

## 2025-04-30 DIAGNOSIS — A04.8 H. PYLORI INFECTION: ICD-10-CM

## 2025-04-30 DIAGNOSIS — Z13.220 LIPID SCREENING: ICD-10-CM

## 2025-04-30 DIAGNOSIS — Z76.89 ENCOUNTER TO ESTABLISH CARE WITH NEW DOCTOR: ICD-10-CM

## 2025-04-30 DIAGNOSIS — K21.9 LARYNGOPHARYNGEAL REFLUX (LPR): ICD-10-CM

## 2025-04-30 DIAGNOSIS — J02.9 SORE THROAT: Primary | ICD-10-CM

## 2025-04-30 PROBLEM — O24.410 DIET CONTROLLED GESTATIONAL DIABETES MELLITUS (GDM) IN THIRD TRIMESTER: Status: RESOLVED | Noted: 2024-04-25 | Resolved: 2025-04-30

## 2025-04-30 PROBLEM — Z28.39 RUBELLA NON-IMMUNE STATUS, ANTEPARTUM: Status: RESOLVED | Noted: 2023-12-02 | Resolved: 2025-04-30

## 2025-04-30 PROBLEM — O09.899 RUBELLA NON-IMMUNE STATUS, ANTEPARTUM: Status: RESOLVED | Noted: 2023-12-02 | Resolved: 2025-04-30

## 2025-04-30 PROCEDURE — 99214 OFFICE O/P EST MOD 30 MIN: CPT | Performed by: FAMILY MEDICINE

## 2025-04-30 NOTE — PROGRESS NOTES
Name: Alexandra Huerta      : 1995      MRN: [id number]  Encounter Provider: Snehal Freire DO  Encounter Date: 2025   Encounter department: [facility]  :  Assessment & Plan  Sore throat  Etiol TBD, HEENT exam normal today; pt has hx GERD and H.pylori dx on EGD  per chart review, but pt states today that she only took 2 days of the treatment meds  Also reports oral/tongue lesion  Orders:    Ambulatory Referral to Gastroenterology; Future    Ambulatory Referral to Oral Maxillofacial Surgery; Future    Encounter to establish care with new doctor         Tongue lesion  Pt states she had wisdom teeth removed in  and had a biopsy on the lesion in her mouth/tongue that came back normal, but still has concern  Orders:    Ambulatory Referral to Oral Maxillofacial Surgery; Future    H. pylori infection  EGD+bx results in chart from  - biopsy showed H. Pylori per chart review, pt admits she did not complete the tx rx'd by GI for +h pylori - states she only took the medications for 2 days  Orders:    Ambulatory Referral to Gastroenterology; Future    Gastroesophageal reflux disease without esophagitis    Orders:    Ambulatory Referral to Gastroenterology; Future    Laryngopharyngeal reflux (LPR)    Orders:    Ambulatory Referral to Gastroenterology; Future    History of gestational diabetes mellitus (GDM)    Orders:    Hemoglobin A1C; Future    Lipid panel; Future    Comprehensive metabolic panel; Future    Encounter for screening examination for impaired glucose regulation and diabetes mellitus    Orders:    Hemoglobin A1C; Future    Comprehensive metabolic panel; Future    Lipid screening    Orders:    Lipid panel; Future    Screening for deficiency anemia    Orders:    CBC and differential; Future      Chief Complaint   Patient presents with    New Patient Visit     Establish Care    Oral Swelling     Pt reports having swelling in her throat. Pt thinks its an infection.          "  History of Present Illness   New pt - no PCP for about 3 years  C/o couple of weeks throat sx, worsening last few days  No known ill contacts  No fever  States she has concern it might be GERD causing the ST, because she has had issues with GERD in the past, but also states she has been getting tonsil stones recently and wonders if it is related to that  States that when she exercises/runs, her throat hurts more - that happening for a few years  - admits she had COVID infection in 2021, but it was happening before that  States had an endoscopy a couple of years ago and \"inflammation\" shown  Also states had wisdom teeth removed in 2021 and had a biopsy on the lesion in her mouth/tongue that came back normal  Also c/o nausea after she eats and occasional vertigo  After review of EGD +bx results in chart from 2022 and discussion with pt that biopsy showed H. pylori, pt admits she did not complete the tx rx'd by GI for +h pylori - states she only took the medications for 2 days  States she moved from [place] 2021, so most of her medical care during her life was in [place]      7/14/2022  [facility] [place]  ASSESSMENT AND PLAN:    1. Laryngopharyngeal reflux (LPR)  2. Gastroesophageal reflux disease, unspecified whether esophagitis present  She reports intermittent burning/sore throat, phlegm, globus sensation, and regurgitation for over 1 year. She was seen by ENT and diagnosed with reflux by laryngoscopy. Symptoms have improved with short courses of omeprazole but she still reports some burning in her throat.   She still wants to reschedule the EGD. We can evaluate for erosive reflux disease, hiatal hernia, and eosinophilic esophagitis. She will continue to limit coffee, tomatoes, chocolate which are reflux trigger foods.  She will continue to eat at least 2 hours before going to bed and follow other lifestyle recommendations.  - EGD; Future  Follow-up in 3 " months  SUBJECTIVE:   27-year-old female with allergies presenting for follow-up of reflux. Since her last visit, she took omeprazole for a few days. This significantly improved her reflux. She was off omeprazole for a few months then restarted it about 1 month ago due to burning in her throat. This did help but she still reports nighttime burning. Overall her sore throat, phlegm, globus sensation, and regurgitation has improved.  She did not undergo endoscopy yet, but she wants to reschedule.      7/18/22  EGD  FINDINGS:  The esophagus, stomach, duodenal bulb, 1st part of the duodenum and 2nd part of the duodenum appeared normal.  Performed multiple forceps biopsies to rule out H. pylori in the fundus of the stomach, body of the stomach, greater curve of the stomach, incisura and antrum  SPECIMENS:  ID Type Source Tests Collected by Time Destination   1 : Gastric biospy r/o h pylori Tissue Stomach TISSUE EXAM Hugo Andres MD 7/18/2022 1:18 PM   IMPRESSION:  Normal EGD to second portion of duodenum.  Random gastric biopsies obtained to rule out H. Pylori.  RECOMMENDATION:  Await pathology results  Follow-up with Primary GI or ENT if symptoms persist.  Consider retrying proton pump inhibitor trial for 2 week.s  MD Sena Walker RN  8/4/2022  4:45 PM EDT   Results reviewed and medication   Sena Condon RN  8/3/2022  2:49 PM EDT    left VM to call back   Che Ortiz PA-C  8/3/2022  8:15 AM EDT   Gastro nurse: Please call patient with H pylori results and order treatment per protocol. Thank you!   Hugo Andres MD  8/2/2022  4:25 PM EDT   Gastric biopsy was positive for H. pylori.  My nurse will let know Ms. Huerta know that she is positive for H. pylori and my physician assistant will initiate treatment.  Medication Renewals and Changes  Bismuth Subsalicylate 262 mg Oral 4 times daily (before meals and at bedtime)  metroNIDAZOLE 250 mg Oral 4 times daily  Pantoprazole Sodium 40 mg Oral 2 times  "daily  Tetracycline HCl 500 mg Oral 4 times daily               Review of Systems    Objective   /60   Pulse 78   Temp 98.4 °F (36.9 °C) (Tympanic)   Ht 4' 11\" (1.499 m)   Wt 78.9 kg (174 lb)   SpO2 98%   BMI 35.14 kg/m²      Physical Exam  Vitals and nursing note reviewed.   Constitutional:       General: She is not in acute distress.     Appearance: She is well-groomed. She is obese. She is not ill-appearing, toxic-appearing or diaphoretic.   HENT:      Head: Normocephalic and atraumatic.      Right Ear: Tympanic membrane, ear canal and external ear normal.      Left Ear: Tympanic membrane, ear canal and external ear normal.      Nose: Nose normal.      Mouth/Throat:      Lips: Pink. No lesions.      Mouth: Mucous membranes are moist. No oral lesions.      Tongue: Tongue does not deviate from midline.      Palate: No mass and lesions.      Pharynx: Oropharynx is clear. No pharyngeal swelling, oropharyngeal exudate, posterior oropharyngeal erythema, uvula swelling or postnasal drip.     Eyes:      Conjunctiva/sclera: Conjunctivae normal.   Cardiovascular:      Rate and Rhythm: Normal rate and regular rhythm.      Heart sounds: Normal heart sounds.   Pulmonary:      Effort: Pulmonary effort is normal.      Breath sounds: Normal breath sounds and air entry.   Abdominal:      General: Bowel sounds are normal. There is no distension.      Palpations: Abdomen is soft. There is no hepatomegaly, splenomegaly or mass.      Tenderness: There is abdominal tenderness (slight) in the epigastric area. There is no guarding or rebound.      Hernia: There is no hernia in the ventral area.   Musculoskeletal:      Right lower leg: No edema.      Left lower leg: No edema.   Skin:     General: Skin is warm and dry.      Coloration: Skin is not pale.   Neurological:      Mental Status: She is alert and oriented to person, place, and time.      Gait: Gait normal.   Psychiatric:         Mood and Affect: Mood normal.         " Behavior: Behavior normal. Behavior is cooperative.         Cognition and Memory: Cognition normal.

## 2025-05-05 PROBLEM — K21.9 LARYNGOPHARYNGEAL REFLUX (LPR): Status: ACTIVE | Noted: 2025-05-05

## 2025-05-05 PROBLEM — Z86.32 HISTORY OF GESTATIONAL DIABETES MELLITUS (GDM): Status: ACTIVE | Noted: 2025-05-05

## 2025-05-05 PROBLEM — A04.8 H. PYLORI INFECTION: Status: ACTIVE | Noted: 2025-05-05

## 2025-05-05 PROBLEM — K21.9 GASTROESOPHAGEAL REFLUX DISEASE WITHOUT ESOPHAGITIS: Status: ACTIVE | Noted: 2025-05-05

## 2025-05-05 PROBLEM — K14.8 TONGUE LESION: Status: ACTIVE | Noted: 2025-05-05

## 2025-05-05 NOTE — ASSESSMENT & PLAN NOTE
Pt states she had wisdom teeth removed in 2021 and had a biopsy on the lesion in her mouth/tongue that came back normal, but still has concern  Orders:    Ambulatory Referral to Oral Maxillofacial Surgery; Future

## 2025-05-05 NOTE — ASSESSMENT & PLAN NOTE
Orders:    Hemoglobin A1C; Future    Lipid panel; Future    Comprehensive metabolic panel; Future

## 2025-05-05 NOTE — ASSESSMENT & PLAN NOTE
EGD+bx results in chart from 2022 - biopsy showed H. Pylori per chart review, pt admits she did not complete the tx rx'd by GI for +h pylori - states she only took the medications for 2 days  Orders:    Ambulatory Referral to Gastroenterology; Future

## 2025-05-20 ENCOUNTER — OFFICE VISIT (OUTPATIENT)
Dept: GASTROENTEROLOGY | Facility: CLINIC | Age: 30
End: 2025-05-20
Attending: FAMILY MEDICINE

## 2025-05-20 VITALS
TEMPERATURE: 98.2 F | WEIGHT: 161.8 LBS | SYSTOLIC BLOOD PRESSURE: 118 MMHG | HEIGHT: 59 IN | DIASTOLIC BLOOD PRESSURE: 64 MMHG | BODY MASS INDEX: 32.62 KG/M2

## 2025-05-20 DIAGNOSIS — A04.8 H. PYLORI INFECTION: Primary | ICD-10-CM

## 2025-05-20 PROBLEM — K21.9 GASTROESOPHAGEAL REFLUX DISEASE WITHOUT ESOPHAGITIS: Status: RESOLVED | Noted: 2025-05-05 | Resolved: 2025-05-20

## 2025-05-20 RX ORDER — BISMUTH SUBSALICYLATE 262 MG/1
524 TABLET, CHEWABLE ORAL 3 TIMES DAILY
Qty: 84 TABLET | Refills: 0 | Status: SHIPPED | OUTPATIENT
Start: 2025-05-20 | End: 2025-06-03

## 2025-05-20 RX ORDER — METRONIDAZOLE 250 MG/1
500 TABLET ORAL EVERY 8 HOURS SCHEDULED
Qty: 84 TABLET | Refills: 0 | Status: SHIPPED | OUTPATIENT
Start: 2025-05-20 | End: 2025-06-03

## 2025-05-20 RX ORDER — TETRACYCLINE HYDROCHLORIDE 500 MG/1
500 CAPSULE ORAL 3 TIMES DAILY
Qty: 42 CAPSULE | Refills: 0 | Status: SHIPPED | OUTPATIENT
Start: 2025-05-20 | End: 2025-06-03

## 2025-05-20 RX ORDER — OMEPRAZOLE 40 MG/1
40 CAPSULE, DELAYED RELEASE ORAL
Qty: 28 CAPSULE | Refills: 0 | Status: SHIPPED | OUTPATIENT
Start: 2025-05-20 | End: 2025-06-03

## 2025-05-20 NOTE — PATIENT INSTRUCTIONS
After competition of antibiotics ~ 1 month later complete stool test to ensure eradication of bacteria

## 2025-05-20 NOTE — PROGRESS NOTES
Name: Alexandra Huerta      : 1995      MRN: [id number]  Encounter Provider: Jemima Gutierrez PA-C  Encounter Date: 2025   Encounter department: [facility] [place]  :  Assessment & Plan  H. pylori infection  I provided education on H. pylori today.  Will treat H pylori with QUAD therapy. Discussed possible side effects of nausea, stomach upset, diarrhea from antibiotics. Discussed NO alcohol at all while on antibiotics. Discussed stools may be black while taking bismuth.   PPI BID x 14 days during treatment.   Discussed importance of follow up and eradication of H pylori to decrease risk of ulcer disease and gastric cancer- patient expressed understanding.   Will plan to order stool testing for H pylori in ~ 1 month to ensure eradication    She will be taking antibiotics after stopping breast-feeding.  She has no known drug allergies.    She does not have any alarming GI symptoms today to warrant repeat endoscopic evaluation or further workup otherwise    All questions answered.  Patient appreciative of care.      Orders:    H. pylori antigen, stool; Future    omeprazole (PriLOSEC) 40 MG capsule; Take 1 capsule (40 mg total) by mouth 2 (two) times a day before meals for 14 days    bismuth subsalicylate (PEPTO BISMOL) 262 MG chewable tablet; Chew 2 tablets (524 mg total) 3 (three) times a day for 14 days    metroNIDAZOLE (FLAGYL) 250 mg tablet; Take 2 tablets (500 mg total) by mouth every 8 (eight) hours for 14 days    tetracycline (ACHROMYCIN,SUMYCIN) 500 MG capsule; Take 1 capsule (500 mg total) by mouth 3 (three) times a day for 14 days      Patient was instructed to call the office with any questions, concerns, new/ worsening/ persisting GI symptoms. Advised patient go to the ER with any severe or worsening abdominal pain, fevers/ chills, intractable N/V, chest pain, SOB, dizziness, lightheadedness, feeling something stuck in esophagus that will not go down. Patient  expressed understanding and is in agreement with treatment plan.     Will plan to follow up as needed      History of Present Illness   HPI  Alexandra Huerta is a 30 y.o. female with a past medical history of GERD, anxiety, prior H. pylori infection who presents to the office today for follow-up as a referral from PCP.  Patient was last seen in the office by Che SIMPSON 7/14/2022, previous office visit reviewed.  At last office visit an EGD was ordered.  Patient ultimately underwent EGD 7/18/2022, this report was reviewed today, this was completely normal.  Gastric biopsy was positive for H. pylori at the time and it was recommended patient undergo treatment of this with antibiotics per protocol.    Recent PCP office visit from 4/30/2025 reviewed at which time patient was referred to us given history of H. pylori and also referred to oral maxillofacial surgery given concern of oral/tongue lesion.    Patient reports feeling well. No GI complaints or concerns today.   Patient states she never took H. pylori antibiotics as previously prescribed.  She is currently breast-feeding but plans to stop in 1 month and would like to take antibiotic treatment at that time.  She has no known drug allergies.  BM's are regular and stool is brown and formed.   She is trying to lose weight.   Patient denies any fevers/ chills, unintentional weight loss, abdominal pain, nausea, vomiting, change in bowel habits,  black or bloody stools, heartburn, dysphagia, odynophagia.   Denies chest pain, SOB      Review of Systems   Constitutional:  Negative for chills and fever.   HENT:  Negative for ear pain and sore throat.    Eyes:  Negative for pain and visual disturbance.   Respiratory:  Negative for cough and shortness of breath.    Cardiovascular:  Negative for chest pain and palpitations.   Gastrointestinal:  Negative for abdominal pain and vomiting.   Genitourinary:  Negative for dysuria and hematuria.   Musculoskeletal:  Negative for  arthralgias and back pain.   Skin:  Negative for color change and rash.   Neurological:  Negative for seizures and syncope.   All other systems reviewed and are negative.    Objective   There were no vitals taken for this visit.     Physical Exam  Vitals reviewed.   Constitutional:       General: She is not in acute distress.     Appearance: She is obese. She is not toxic-appearing.   HENT:      Head: Normocephalic and atraumatic.     Eyes:      Extraocular Movements: Extraocular movements intact.      Conjunctiva/sclera: Conjunctivae normal.       Cardiovascular:      Rate and Rhythm: Normal rate and regular rhythm.   Pulmonary:      Effort: Pulmonary effort is normal. No respiratory distress.      Breath sounds: Normal breath sounds.   Abdominal:      General: Bowel sounds are normal.      Palpations: Abdomen is soft.      Tenderness: There is no abdominal tenderness.     Musculoskeletal:         General: No swelling or tenderness.      Cervical back: Normal range of motion and neck supple.     Skin:     General: Skin is warm and dry.      Coloration: Skin is not jaundiced.     Neurological:      General: No focal deficit present.      Mental Status: She is alert and oriented to person, place, and time. Mental status is at baseline.     Psychiatric:         Mood and Affect: Mood normal.         Behavior: Behavior normal.         Thought Content: Thought content normal.

## 2025-05-20 NOTE — ASSESSMENT & PLAN NOTE
I provided education on H. pylori today.  Will treat H pylori with QUAD therapy. Discussed possible side effects of nausea, stomach upset, diarrhea from antibiotics. Discussed NO alcohol at all while on antibiotics. Discussed stools may be black while taking bismuth.   PPI BID x 14 days during treatment.   Discussed importance of follow up and eradication of H pylori to decrease risk of ulcer disease and gastric cancer- patient expressed understanding.   Will plan to order stool testing for H pylori in ~ 1 month to ensure eradication    She will be taking antibiotics after stopping breast-feeding.  She has no known drug allergies.    She does not have any alarming GI symptoms today to warrant repeat endoscopic evaluation or further workup otherwise    All questions answered.  Patient appreciative of care.      Orders:    H. pylori antigen, stool; Future    omeprazole (PriLOSEC) 40 MG capsule; Take 1 capsule (40 mg total) by mouth 2 (two) times a day before meals for 14 days    bismuth subsalicylate (PEPTO BISMOL) 262 MG chewable tablet; Chew 2 tablets (524 mg total) 3 (three) times a day for 14 days    metroNIDAZOLE (FLAGYL) 250 mg tablet; Take 2 tablets (500 mg total) by mouth every 8 (eight) hours for 14 days    tetracycline (ACHROMYCIN,SUMYCIN) 500 MG capsule; Take 1 capsule (500 mg total) by mouth 3 (three) times a day for 14 days

## 2025-07-25 NOTE — PROGRESS NOTES
Virtual Regular Visit  Name: Alexandra Huerta      : 1995      MRN: [id number]  Encounter Provider: Jemima Ibarra PA-C  Encounter Date: 2025   Encounter department: VIRTUAL CARE       Verification of patient location:  Patient is located at Home in the following state in which I hold an active license [place] :  Assessment & Plan  Nausea    Orders:    ondansetron (ZOFRAN) 4 mg tablet; Take 1 tablet (4 mg total) by mouth every 8 (eight) hours as needed for nausea or vomiting    Recommend she continue to push fluids. Does not appear concern for dehydration needing ER at this time.  She is improving after drinking a full water bottle.  Continue fluids. If worsen or no improvement go to ER>    Encounter provider Jemima Ibarra PA-C    The patient was identified by name and date of birth. Alexandra Huerta was informed that this is a telemedicine visit and that the visit is being conducted through the Epic Embedded platform. She agrees to proceed..  My office door was closed. No one else was in the room.  She acknowledged consent and understanding of privacy and security of the video platform. The patient has agreed to participate and understands they can discontinue the visit at any time.    Patient is aware this is a billable service.     History of Present Illness     Patient states that she isn't feeling well.  She feels midly dehydrated. She has been dehydrated in the past to the point she needed to go to the hospital.  She is starting to feel a little bit better.  She is breast feeding. She was having nausea, dizziness.  She had no vomiting.  She is able to keep things down right now. She denies any diarrhea.  She has been having the GI symptoms since this morning.  She had a negative pregnancy test.  She wasn't drinking a lot of water. Her urine has been very yellow the past few days. Her mouth is still wet. She doesn't feel like she is going to pass out. She drank some water and kept it down which helped. She  has urinated once today.        Review of Systems   Constitutional: Negative.    HENT: Negative.     Gastrointestinal:  Positive for nausea. Negative for diarrhea and vomiting.   Musculoskeletal: Negative.    Skin: Negative.    Neurological: Negative.    Psychiatric/Behavioral: Negative.         Objective   LMP  (LMP Unknown)     Physical Exam  Constitutional:       General: She is not in acute distress.     Appearance: Normal appearance. She is not ill-appearing, toxic-appearing or diaphoretic.   HENT:      Head: Normocephalic and atraumatic.   Pulmonary:      Effort: Pulmonary effort is normal. No respiratory distress.   Skin:     General: Skin is dry.   Neurological:      General: No focal deficit present.      Mental Status: She is alert and oriented to person, place, and time.   Psychiatric:         Mood and Affect: Mood normal.         Behavior: Behavior normal.         Visit Time  Total Visit Duration: 5   96